# Patient Record
Sex: FEMALE | Race: WHITE | Employment: FULL TIME | URBAN - METROPOLITAN AREA
[De-identification: names, ages, dates, MRNs, and addresses within clinical notes are randomized per-mention and may not be internally consistent; named-entity substitution may affect disease eponyms.]

---

## 2017-02-27 ENCOUNTER — OFFICE VISIT (OUTPATIENT)
Dept: FAMILY MEDICINE | Facility: CLINIC | Age: 32
End: 2017-02-27
Payer: COMMERCIAL

## 2017-02-27 VITALS
HEART RATE: 84 BPM | DIASTOLIC BLOOD PRESSURE: 70 MMHG | WEIGHT: 178 LBS | HEIGHT: 70 IN | BODY MASS INDEX: 25.48 KG/M2 | TEMPERATURE: 97.7 F | RESPIRATION RATE: 20 BRPM | SYSTOLIC BLOOD PRESSURE: 116 MMHG

## 2017-02-27 DIAGNOSIS — Z30.430 ENCOUNTER FOR IUD INSERTION: ICD-10-CM

## 2017-02-27 DIAGNOSIS — Z00.00 ROUTINE GENERAL MEDICAL EXAMINATION AT A HEALTH CARE FACILITY: Primary | ICD-10-CM

## 2017-02-27 DIAGNOSIS — B36.0 TINEA VERSICOLOR: ICD-10-CM

## 2017-02-27 LAB
ANION GAP SERPL CALCULATED.3IONS-SCNC: 7 MMOL/L (ref 3–14)
BETA HCG QUAL IFA URINE: NEGATIVE
BUN SERPL-MCNC: 9 MG/DL (ref 7–30)
CALCIUM SERPL-MCNC: 8.9 MG/DL (ref 8.5–10.1)
CHLORIDE SERPL-SCNC: 112 MMOL/L (ref 94–109)
CHOLEST SERPL-MCNC: 153 MG/DL
CO2 SERPL-SCNC: 23 MMOL/L (ref 20–32)
CREAT SERPL-MCNC: 0.85 MG/DL (ref 0.52–1.04)
GFR SERPL CREATININE-BSD FRML MDRD: 77 ML/MIN/1.7M2
GLUCOSE SERPL-MCNC: 88 MG/DL (ref 70–99)
HDLC SERPL-MCNC: 39 MG/DL
LDLC SERPL CALC-MCNC: 86 MG/DL
NONHDLC SERPL-MCNC: 114 MG/DL
POTASSIUM SERPL-SCNC: 4.2 MMOL/L (ref 3.4–5.3)
SODIUM SERPL-SCNC: 142 MMOL/L (ref 133–144)
TRIGL SERPL-MCNC: 140 MG/DL

## 2017-02-27 PROCEDURE — 80048 BASIC METABOLIC PNL TOTAL CA: CPT | Performed by: FAMILY MEDICINE

## 2017-02-27 PROCEDURE — 87624 HPV HI-RISK TYP POOLED RSLT: CPT | Performed by: FAMILY MEDICINE

## 2017-02-27 PROCEDURE — G0145 SCR C/V CYTO,THINLAYER,RESCR: HCPCS | Performed by: FAMILY MEDICINE

## 2017-02-27 PROCEDURE — 58300 INSERT INTRAUTERINE DEVICE: CPT | Performed by: FAMILY MEDICINE

## 2017-02-27 PROCEDURE — 84703 CHORIONIC GONADOTROPIN ASSAY: CPT | Performed by: FAMILY MEDICINE

## 2017-02-27 PROCEDURE — 80061 LIPID PANEL: CPT | Performed by: FAMILY MEDICINE

## 2017-02-27 PROCEDURE — 99395 PREV VISIT EST AGE 18-39: CPT | Mod: 25 | Performed by: FAMILY MEDICINE

## 2017-02-27 PROCEDURE — 36415 COLL VENOUS BLD VENIPUNCTURE: CPT | Performed by: FAMILY MEDICINE

## 2017-02-27 RX ORDER — KETOCONAZOLE 20 MG/G
CREAM TOPICAL 2 TIMES DAILY
Qty: 60 G | Refills: 1 | Status: SHIPPED | OUTPATIENT
Start: 2017-02-27 | End: 2018-01-09

## 2017-02-27 NOTE — PROGRESS NOTES
SUBJECTIVE:     CC: Diana Roblero is an 31 year old woman who presents for preventive health visit.     Healthy Habits:    Do you get at least three servings of calcium containing foods daily (dairy, green leafy vegetables, etc.)? no, taking calcium and/or vitamin D supplement: yes - vit d    Amount of exercise or daily activities, outside of work: 1 day(s) per week    Problems taking medications regularly No    Medication side effects: No    Have you had an eye exam in the past two years? yes    Do you see a dentist twice per year? yes    Do you have sleep apnea, excessive snoring or daytime drowsiness?yes with monthly menstrual cycle      Wondering about getting IUD with exam today.  Does have period associated migraines.  LMP 2/21/17.  Notes periods have been heavier and crampier recently.  Bleeding is heavy in early period, essentially done now.  Has been using condoms.  Last intercourse 2/19/17.    Today's PHQ-2 Score:   PHQ-2 ( 1999 [employer]) 2/27/2017 10/23/2015   Q1: Little interest or pleasure in doing things 0 1   Q2: Feeling down, depressed or hopeless 0 1   PHQ-2 Score 0 2       Abuse: Current or Past(Physical, Sexual or Emotional)- No  Do you feel safe in your environment - Yes    Social History   Substance Use Topics     Smoking status: Current Every Day Smoker     Packs/day: 1.00     Types: Cigarettes     Smokeless tobacco: Never Used     Alcohol use No     The patient does not drink >3 drinks per day nor >7 drinks per week.    No results for input(s): CHOL, HDL, LDL, TRIG, CHOLHDLRATIO, NHDL in the last 75246 hours.    Reviewed orders with patient.  Reviewed health maintenance and updated orders accordingly - Yes    Mammo Decision Support:  Mammogram not appropriate for this patient based on age.    Pertinent mammograms are reviewed under the imaging tab.  History of abnormal Pap smear: NO - age 30- 65 PAP every 3 years recommended  All Histories reviewed and updated in Epic.      ROS:  C:  NEGATIVE for fever, chills, change in weight  I: NEGATIVE for worrisome rashes, moles or lesions  E: NEGATIVE for vision changes or irritation  ENT: NEGATIVE for ear, mouth and throat problems  R: NEGATIVE for significant cough or SOB  B: NEGATIVE for masses, tenderness or discharge  CV: NEGATIVE for chest pain, palpitations or peripheral edema  GI: NEGATIVE for nausea, abdominal pain, heartburn, or change in bowel habits  : NEGATIVE for unusual urinary or vaginal symptoms. No vaginal bleeding.  M: NEGATIVE for significant arthralgias or myalgia  N: NEGATIVE for weakness, dizziness or paresthesias  P: NEGATIVE for changes in mood or affect     Problem list, Medication list, Allergies, and Medical/Social/Surgical histories reviewed in [facility] and updated as appropriate.  OBJECTIVE:     There were no vitals taken for this visit.  EXAM:  GENERAL: healthy, alert and no distress  EYES: Eyes grossly normal to inspection, PERRL and conjunctivae and sclerae normal  HENT: ear canals and TM's normal, nose and mouth without ulcers or lesions  NECK: no adenopathy, no asymmetry, masses, or scars and thyroid normal to palpation  RESP: lungs clear to auscultation - no rales, rhonchi or wheezes  CV: regular rate and rhythm, normal S1 S2, no S3 or S4, no murmur, click or rub, no peripheral edema and peripheral pulses strong  ABDOMEN: soft, nontender, no hepatosplenomegaly, no masses and bowel sounds normal  MS: no gross musculoskeletal defects noted, no edema  SKIN: no suspicious lesions or rashes  NEURO: Normal strength and tone, mentation intact and speech normal  PSYCH: mentation appears normal, affect normal/bright    ASSESSMENT/PLAN:     1. Routine general medical examination at a health care facility    - LIPID REFLEX TO DIRECT LDL PANEL  - Pap imaged thin layer screen with HPV - recommended age 30 - 65  - Basic metabolic panel    2. Encounter for IUD insertion  Upt-neg   Sterile spec   Betadaine prep    Tenaculum placed  "  Sounded to 7.5 cm   MIrena inserted   String clipped   Observed for 30 min    - Beta HCG qual IFA urine  - levonorgestrel (MIRENA) 20 MCG/24HR IUD; 1 each (20 mcg) by Intrauterine route once for 1 dose  Dispense: 1 each; Refill: 0  - INSERTION INTRAUTERINE DEVICE    3. Tinea versicolor    - ketoconazole (NIZORAL) 2 % cream; Apply topically 2 times daily  Dispense: 60 g; Refill: 1    COUNSELING:   Reviewed preventive health counseling, as reflected in patient instructions       Regular exercise       Vision screening         reports that she has been smoking Cigarettes.  She has been smoking about 1.00 pack per day. She has never used smokeless tobacco.  Tobacco Cessation Action Plan: Information offered: Patient not interested at this time  Estimated body mass index is 26.91 kg/(m^2) as calculated from the following:    Height as of 7/19/16: 5' 9\" (1.753 m).    Weight as of 11/1/16: 182 lb 3.2 oz (82.6 kg).       Counseling Resources:  ATP IV Guidelines  Pooled Cohorts Equation Calculator  Breast Cancer Risk Calculator  FRAX Risk Assessment  ICSI Preventive Guidelines  Dietary Guidelines for Americans, 2010  [employer]'s MyPlate  ASA Prophylaxis  Lung CA Screening    Jignesh Fine MD  [facility]  "

## 2017-02-27 NOTE — MR AVS SNAPSHOT
After Visit Summary   2/27/2017    Diana Roblero    MRN: [id number]           Patient Information     Date Of Birth          1985        Visit Information        Provider Department      2/27/2017 8:00 AM Jignesh Fine MD [facility]        Today's Diagnoses     Routine general medical examination at a health care facility    -  1    Encounter for IUD insertion        Tinea versicolor          Care Instructions      Preventive Health Recommendations  Female Ages 26 - 39  Yearly exam:   See your health care provider every year in order to    Review health changes.     Discuss preventive care.      Review your medicines if you your doctor has prescribed any.    Until age 30: Get a Pap test every three years (more often if you have had an abnormal result).    After age 30: Talk to your doctor about whether you should have a Pap test every 3 years or have a Pap test with HPV screening every 5 years.   You do not need a Pap test if your uterus was removed (hysterectomy) and you have not had cancer.  You should be tested each year for STDs (sexually transmitted diseases), if you're at risk.   Talk to your provider about how often to have your cholesterol checked.  If you are at risk for diabetes, you should have a diabetes test (fasting glucose).  Shots: Get a flu shot each year. Get a tetanus shot every 10 years.   Nutrition:     Eat at least 5 servings of fruits and vegetables each day.    Eat whole-grain bread, whole-wheat pasta and brown rice instead of white grains and rice.    Talk to your provider about Calcium and Vitamin D.     Lifestyle    Exercise at least 150 minutes a week (30 minutes a day, 5 days of the week). This will help you control your weight and prevent disease.    Limit alcohol to one drink per day.    No smoking.     Wear sunscreen to prevent skin cancer.    See your dentist every six months for an exam and cleaning.          Follow-ups after  "your visit        Follow-up notes from your care team     Return in about 1 year (around 2/27/2018).      Who to contact     If you have questions or need follow up information about today's clinic visit or your schedule please contact [facility] directly at [phone].  Normal or non-critical lab and imaging results will be communicated to you by MyChart, letter or phone within 4 business days after the clinic has received the results. If you do not hear from us within 7 days, please contact the clinic through PassH[employer]art or phone. If you have a critical or abnormal lab result, we will notify you by phone as soon as possible.  Submit refill requests through [employer] or call your pharmacy and they will forward the refill request to us. Please allow 3 business days for your refill to be completed.          Additional Information About Your Visit        MyChart Information     [employer] gives you secure access to your electronic health record. If you see a primary care provider, you can also send messages to your care team and make appointments. If you have questions, please call your primary care clinic.  If you do not have a primary care provider, please call [phone] and they will assist you.        Care EveryWhere ID     This is your Care EveryWhere ID. This could be used by other organizations to access your [place] medical records  [id number]        Your Vitals Were     Pulse Temperature Respirations Height Last Period BMI (Body Mass Index)    84 97.7  F (36.5  C) (Oral) 20 5' 9.75\" (1.772 m) 02/21/2017 (Exact Date) 25.72 kg/m2       Blood Pressure from Last 3 Encounters:   02/27/17 116/70   11/01/16 102/64   07/19/16 124/72    Weight from Last 3 Encounters:   02/27/17 178 lb (80.7 kg)   11/01/16 182 lb 3.2 oz (82.6 kg)   07/19/16 182 lb (82.6 kg)              We Performed the Following     Basic metabolic panel     Beta HCG qual IFA urine     INSERTION INTRAUTERINE DEVICE     LIPID REFLEX TO " DIRECT LDL PANEL     Pap imaged thin layer screen with HPV - recommended age 30 - 65          Today's Medication Changes          These changes are accurate as of: 2/27/17  8:59 AM.  If you have any questions, ask your nurse or doctor.               Start taking these medicines.        Dose/Directions    ketoconazole 2 % cream   Commonly known as:  NIZORAL   Used for:  Tinea versicolor   Started by:  Jignesh Fine MD        Apply topically 2 times daily   Quantity:  60 g   Refills:  1       levonorgestrel 20 MCG/24HR IUD   Commonly known as:  MIRENA   Used for:  Encounter for IUD insertion   Started by:  Jignesh Fine MD        Dose:  1 each   1 each (20 mcg) by Intrauterine route once for 1 dose   Quantity:  1 each   Refills:  0            Where to get your medicines      These medications were sent to [facility] [address] [facility] [address]  [address] [facility] [address]     Phone:  [phone]     ketoconazole 2 % cream         Some of these will need a paper prescription and others can be bought over the counter.  Ask your nurse if you have questions.     You don't need a prescription for these medications     levonorgestrel 20 MCG/24HR IUD                Primary Care Provider Office Phone # Fax #    Jignesh Fine -[phone]       [facility] [address]  [facility] [address]        Thank you!     Thank you for choosing [facility]  for your care. Our goal is always to provide you with excellent care. Hearing back from our patients is one way we can continue to improve our services. Please take a few minutes to complete the written survey that you may receive in the mail after your visit with us. Thank you!             Your Updated Medication List - Protect others around you: Learn how to safely use, store and throw away your medicines at www.disposemymeds.org.          This  list is accurate as of: 2/27/17  8:59 AM.  Always use your most recent med list.                   Brand Name Dispense Instructions for use    busPIRone 10 MG tablet    BUSPAR    540 tablet    Take 2 tablets (20 mg) by mouth 3 times daily       escitalopram 20 MG tablet    LEXAPRO    90 tablet    Take 1 tablet (20 mg) by mouth daily       ketoconazole 2 % cream    NIZORAL    60 g    Apply topically 2 times daily       levonorgestrel 20 MCG/24HR IUD    MIRENA    1 each    1 each (20 mcg) by Intrauterine route once for 1 dose       SUMAtriptan 100 MG tablet    IMITREX    30 tablet    Take 1 tablet (100 mg) by mouth at onset of headache for migraine (May repeat in 2 hours if needed.  Do not exceed 2 tabs in 24 hours.)       topiramate 100 MG tablet    TOPAMAX    90 tablet    Take 1 tablet (100 mg) by mouth daily       VITAMIN D-3 PO      Take 2,000 Units by mouth

## 2017-02-27 NOTE — NURSING NOTE
"Chief Complaint   Patient presents with     Physical     with pap     Blood Draw     patient IS fasting       Initial /70 (BP Location: Right arm, Patient Position: Chair, Cuff Size: Adult Regular)  Pulse 84  Temp 97.7  F (36.5  C) (Oral)  Resp 20  Ht 5' 9.75\" (1.772 m)  Wt 178 lb (80.7 kg)  LMP 02/21/2017 (Exact Date)  BMI 25.72 kg/m2 Estimated body mass index is 25.72 kg/(m^2) as calculated from the following:    Height as of this encounter: 5' 9.75\" (1.772 m).    Weight as of this encounter: 178 lb (80.7 kg).  Medication Reconciliation: complete   Jo Merrill MA      "

## 2017-03-01 LAB
COPATH REPORT: NORMAL
PAP: NORMAL

## 2017-03-02 LAB
FINAL DIAGNOSIS: NORMAL
HPV HR 12 DNA CVX QL NAA+PROBE: NEGATIVE
HPV16 DNA SPEC QL NAA+PROBE: NEGATIVE
HPV18 DNA SPEC QL NAA+PROBE: NEGATIVE
SPECIMEN DESCRIPTION: NORMAL

## 2017-03-08 ENCOUNTER — OFFICE VISIT (OUTPATIENT)
Dept: FAMILY MEDICINE | Facility: CLINIC | Age: 32
End: 2017-03-08
Payer: COMMERCIAL

## 2017-03-08 VITALS
SYSTOLIC BLOOD PRESSURE: 114 MMHG | DIASTOLIC BLOOD PRESSURE: 78 MMHG | HEART RATE: 80 BPM | WEIGHT: 179.3 LBS | BODY MASS INDEX: 25.67 KG/M2 | TEMPERATURE: 98 F | HEIGHT: 70 IN

## 2017-03-08 DIAGNOSIS — R30.0 DYSURIA: ICD-10-CM

## 2017-03-08 DIAGNOSIS — N76.0 BACTERIAL VAGINOSIS: Primary | ICD-10-CM

## 2017-03-08 DIAGNOSIS — R10.9 FLANK PAIN: ICD-10-CM

## 2017-03-08 DIAGNOSIS — B96.89 BACTERIAL VAGINOSIS: Primary | ICD-10-CM

## 2017-03-08 DIAGNOSIS — R10.2 PELVIC PAIN IN FEMALE: ICD-10-CM

## 2017-03-08 LAB
ALBUMIN UR-MCNC: NEGATIVE MG/DL
APPEARANCE UR: CLEAR
BACTERIA #/AREA URNS HPF: ABNORMAL /HPF
BILIRUB UR QL STRIP: NEGATIVE
COLOR UR AUTO: ABNORMAL
GLUCOSE UR STRIP-MCNC: NEGATIVE MG/DL
HGB UR QL STRIP: ABNORMAL
KETONES UR STRIP-MCNC: ABNORMAL MG/DL
LEUKOCYTE ESTERASE UR QL STRIP: NEGATIVE
MICRO REPORT STATUS: ABNORMAL
NITRATE UR QL: NEGATIVE
NON-SQ EPI CELLS #/AREA URNS LPF: ABNORMAL /LPF
PH UR STRIP: 6.5 PH (ref 5–7)
RBC #/AREA URNS AUTO: ABNORMAL /HPF (ref 0–2)
SP GR UR STRIP: 1.02 (ref 1–1.03)
SPECIMEN SOURCE: ABNORMAL
URN SPEC COLLECT METH UR: ABNORMAL
UROBILINOGEN UR STRIP-ACNC: 0.2 EU/DL (ref 0.2–1)
WBC #/AREA URNS AUTO: ABNORMAL /HPF (ref 0–2)
WET PREP SPEC: ABNORMAL

## 2017-03-08 PROCEDURE — 81001 URINALYSIS AUTO W/SCOPE: CPT | Performed by: NURSE PRACTITIONER

## 2017-03-08 PROCEDURE — 87210 SMEAR WET MOUNT SALINE/INK: CPT | Performed by: NURSE PRACTITIONER

## 2017-03-08 PROCEDURE — 99213 OFFICE O/P EST LOW 20 MIN: CPT | Performed by: NURSE PRACTITIONER

## 2017-03-08 PROCEDURE — 87086 URINE CULTURE/COLONY COUNT: CPT | Performed by: NURSE PRACTITIONER

## 2017-03-08 RX ORDER — CLINDAMYCIN HCL 300 MG
300 CAPSULE ORAL 2 TIMES DAILY
Qty: 14 CAPSULE | Refills: 0 | Status: SHIPPED | OUTPATIENT
Start: 2017-03-08 | End: 2017-03-15

## 2017-03-08 NOTE — PROGRESS NOTES
"  SUBJECTIVE:                                                    Diana Roblero is a 31 year old female who presents to clinic today for the following health issues:      URINARY TRACT SYMPTOMS      Duration: Last Tuesday    Description  Pressure in bladder, and lower pain pain, dark urine    Intensity:  moderate    Accompanying signs and symptoms:  Fever/chills: no   Flank pain no   Nausea and vomiting: no   Vaginal symptoms: none  Abdominal/Pelvic Pain: YES    History  History of frequent UTI's: no   History of kidney stones: YES  Sexually Active: YES  Possibility of pregnancy:     Precipitating or alleviating factors: IUD placed last week    Therapies tried and outcome: none            Reviewed and updated as needed this visit by clinical staff  Tobacco  Allergies  Meds  Med Hx  Surg Hx  Fam Hx  Soc Hx      Reviewed and updated as needed this visit by Provider         ROS:  Constitutional, HEENT, cardiovascular, pulmonary, gi and gu systems are negative, except as otherwise noted.    OBJECTIVE:                                                    /78  Pulse 80  Temp 98  F (36.7  C) (Oral)  Ht 5' 9.75\" (1.772 m)  Wt 179 lb 4.8 oz (81.3 kg)  LMP 02/21/2017 (Exact Date)  BMI 25.91 kg/m2  Body mass index is 25.91 kg/(m^2).   young female in no acute distress. Nontoxic looking. Exam was performed under chaperone. Soft abdomen that was nontender with bowel sounds active throughout. Some pelvic discomfort. No flank tenderness. Pelvic exam revealing a well rugated vaginal introitus. Cervix was closed with IUD strings being noted. Mucus is white odorous discharge.    Results for orders placed or performed in visit on 03/08/17   *UA reflex to Microscopic and Culture ([facility] (except [place] and [place])   Result Value Ref Range    Color Urine Orange     Appearance Urine Clear     Glucose Urine Negative NEG mg/dL    Bilirubin Urine Negative NEG    Ketones Urine " Trace (A) NEG mg/dL    Specific Gravity Urine 1.025 1.003 - 1.035    Blood Urine Trace (A) NEG    pH Urine 6.5 5.0 - 7.0 pH    Protein Albumin Urine Negative NEG mg/dL    Urobilinogen Urine 0.2 0.2 - 1.0 EU/dL    Nitrite Urine Negative NEG    Leukocyte Esterase Urine Negative NEG    Source Midstream Urine    Urine Microscopic   Result Value Ref Range    WBC Urine O - 2 0 - 2 /HPF    RBC Urine O - 2 0 - 2 /HPF    Squamous Epithelial /LPF Urine Few FEW /LPF    Bacteria Urine Few (A) NEG /HPF   Wet prep   Result Value Ref Range    Specimen Description Vagina     Wet Prep (A)      Clue cells seen  No Trichomonas seen  No yeast seen      Micro Report Status FINAL 03/08/2017             ASSESSMENT/PLAN:                                                      Symptoms probably from bacterial vaginitis. Other differentials discussed. Treated as below. Follow-up with any ongoing concerns.        ICD-10-CM    1. Dysuria R30.0 *UA reflex to Microscopic and Culture ([facility] (except [place] and [place])     Urine Microscopic     Urine Culture Aerobic Bacterial   2. Pelvic pain in female R10.2 Wet prep   3. Flank pain R10.9    4. Bacterial vaginosis N76.0 clindamycin (CLEOCIN) 300 MG capsule    B96.89          ELISA Weston [facility]

## 2017-03-08 NOTE — NURSING NOTE
"Chief Complaint   Patient presents with     UTI       Initial /78  Pulse 80  Temp 98  F (36.7  C) (Oral)  Ht 5' 9.75\" (1.772 m)  Wt 179 lb 4.8 oz (81.3 kg)  LMP 02/21/2017 (Exact Date)  BMI 25.91 kg/m2 Estimated body mass index is 25.91 kg/(m^2) as calculated from the following:    Height as of this encounter: 5' 9.75\" (1.772 m).    Weight as of this encounter: 179 lb 4.8 oz (81.3 kg).  Medication Reconciliation: complete     Carine Mata CMA      "

## 2017-03-08 NOTE — MR AVS SNAPSHOT
After Visit Summary   3/8/2017    Diana Roblero    MRN: [id number]           Patient Information     Date Of Birth          1985        Visit Information        Provider Department      3/8/2017 4:00 PM Mykel Hoang APRN [facility]        Today's Diagnoses     Dysuria    -  1    Pelvic pain in female        Flank pain        Bacterial vaginosis          Care Instructions      Bacterial Vaginosis    You have a vaginal infection called bacterial vaginosis (BV). Both good and bad bacteria are present in a healthy vagina. BV occurs when these bacteria get out of balance. The number of bad bacteria increase. And the number of good bacteria decrease.  BV may or may not cause symptoms. If symptoms do occur, they can include:    Thin, gray, milky-white, or sometimes green discharge    Unpleasant odor or  fishy  smell    Itching, burning, or pain in or around the vagina  It is not known what causes BV, but certain factors can make the problem more likely. This can include:    Douching    Having sex with a new partner    Having sex with more than one partner  BV will sometimes go away on its own. But treatment is usually recommended. This is because untreated BV can increase the risk of more serious health problems such as:    Pelvic inflammatory disease (PID)     delivery (giving birth to a baby early if you re pregnant)    HIV and certain other sexually transmitted diseases (STDs)    Infection after surgery on the reproductive organs  Home care  General care    BV is most often treated with medicines called antibiotics. These may be given as pills or as a vaginal cream. If antibiotics are prescribed, be sure to use them exactly as directed. Also, be sure to complete all of the medicine, even if your symptoms go away.    Avoid douching or having sex during treatment.    If you have sex with a female partner, ask your healthcare provider if she should also be  treated.  Prevention    Limit or avoid douching.    Avoid having sex. If you do have sex, then take steps to lower your risk:    Use condoms when having sex.    Limit the number of partners you have sex with.  Follow-up care  Follow up with your healthcare provider, or as advised.  When to seek medical advice  Call your healthcare provider right away if:    You have a fever of 100.4 F (38 C) or higher, or as directed by your provider.    Your symptoms worsen, or they don t go away within a few days of starting treatment.    You have new pain in the lower belly or pelvic region.    You have side effects that bother you or a reaction to the pills or cream you re prescribed.    You or any partners you have sex with have new symptoms, such as a rash, joint pain, or sores.    3005-0232 [employer]. [address] [place] [address]. All rights reserved. This information is not intended as a substitute for professional medical care. Always follow your healthcare professional's instructions.              Follow-ups after your visit        Who to contact     If you have questions or need follow up information about today's clinic visit or your schedule please contact [facility] directly at [phone].  Normal or non-critical lab and imaging results will be communicated to you by MyChart, letter or phone within 4 business days after the clinic has received the results. If you do not hear from us within 7 days, please contact the clinic through [employer] or phone. If you have a critical or abnormal lab result, we will notify you by phone as soon as possible.  Submit refill requests through [employer] or call your pharmacy and they will forward the refill request to us. Please allow 3 business days for your refill to be completed.          Additional Information About Your Visit        [employer] Information     [employer] gives you secure access to your electronic health record. If you see a  "primary care provider, you can also send messages to your care team and make appointments. If you have questions, please call your primary care clinic.  If you do not have a primary care provider, please call [phone] and they will assist you.        Care EveryWhere ID     This is your Care EveryWhere ID. This could be used by other organizations to access your [place] medical records  [id number]        Your Vitals Were     Pulse Temperature Height Last Period BMI (Body Mass Index)       80 98  F (36.7  C) (Oral) 5' 9.75\" (1.772 m) 02/21/2017 (Exact Date) 25.91 kg/m2        Blood Pressure from Last 3 Encounters:   03/08/17 114/78   02/27/17 116/70   11/01/16 102/64    Weight from Last 3 Encounters:   03/08/17 179 lb 4.8 oz (81.3 kg)   02/27/17 178 lb (80.7 kg)   11/01/16 182 lb 3.2 oz (82.6 kg)              We Performed the Following     *UA reflex to Microscopic and Culture ([facility] (except [place] and [place])     Urine Culture Aerobic Bacterial     Urine Microscopic     Wet prep          Today's Medication Changes          These changes are accurate as of: 3/8/17  5:19 PM.  If you have any questions, ask your nurse or doctor.               Start taking these medicines.        Dose/Directions    clindamycin 300 MG capsule   Commonly known as:  CLEOCIN   Used for:  Bacterial vaginosis   Started by:  Mykel Hoang APRN CNP        Dose:  300 mg   Take 1 capsule (300 mg) by mouth 2 times daily for 7 days   Quantity:  14 capsule   Refills:  0            Where to get your medicines      These medications were sent to [facility] [address] [facility] [address] [address] [facility], [facility] [address]     Phone:  [phone]     clindamycin 300 MG capsule                Primary Care Provider Office Phone # Fax #    Jignesh Fine [phone]       [facility] [address]  [address] [facility] " [place] [address]        Thank you!     Thank you for choosing [facility]  for your care. Our goal is always to provide you with excellent care. Hearing back from our patients is one way we can continue to improve our services. Please take a few minutes to complete the written survey that you may receive in the mail after your visit with us. Thank you!             Your Updated Medication List - Protect others around you: Learn how to safely use, store and throw away your medicines at www.disposemymeds.org.          This list is accurate as of: 3/8/17  5:19 PM.  Always use your most recent med list.                   Brand Name Dispense Instructions for use    busPIRone 10 MG tablet    BUSPAR    540 tablet    Take 2 tablets (20 mg) by mouth 3 times daily       clindamycin 300 MG capsule    CLEOCIN    14 capsule    Take 1 capsule (300 mg) by mouth 2 times daily for 7 days       escitalopram 20 MG tablet    LEXAPRO    90 tablet    Take 1 tablet (20 mg) by mouth daily       ketoconazole 2 % cream    NIZORAL    60 g    Apply topically 2 times daily       levonorgestrel 20 MCG/24HR IUD    MIRENA    1 each    1 each (20 mcg) by Intrauterine route once for 1 dose       SUMAtriptan 100 MG tablet    IMITREX    30 tablet    Take 1 tablet (100 mg) by mouth at onset of headache for migraine (May repeat in 2 hours if needed.  Do not exceed 2 tabs in 24 hours.)       topiramate 100 MG tablet    TOPAMAX    90 tablet    Take 1 tablet (100 mg) by mouth daily       VITAMIN D-3 PO      Take 2,000 Units by mouth

## 2017-03-08 NOTE — PATIENT INSTRUCTIONS
Bacterial Vaginosis    You have a vaginal infection called bacterial vaginosis (BV). Both good and bad bacteria are present in a healthy vagina. BV occurs when these bacteria get out of balance. The number of bad bacteria increase. And the number of good bacteria decrease.  BV may or may not cause symptoms. If symptoms do occur, they can include:    Thin, gray, milky-white, or sometimes green discharge    Unpleasant odor or  fishy  smell    Itching, burning, or pain in or around the vagina  It is not known what causes BV, but certain factors can make the problem more likely. This can include:    Douching    Having sex with a new partner    Having sex with more than one partner  BV will sometimes go away on its own. But treatment is usually recommended. This is because untreated BV can increase the risk of more serious health problems such as:    Pelvic inflammatory disease (PID)     delivery (giving birth to a baby early if you re pregnant)    HIV and certain other sexually transmitted diseases (STDs)    Infection after surgery on the reproductive organs  Home care  General care    BV is most often treated with medicines called antibiotics. These may be given as pills or as a vaginal cream. If antibiotics are prescribed, be sure to use them exactly as directed. Also, be sure to complete all of the medicine, even if your symptoms go away.    Avoid douching or having sex during treatment.    If you have sex with a female partner, ask your healthcare provider if she should also be treated.  Prevention    Limit or avoid douching.    Avoid having sex. If you do have sex, then take steps to lower your risk:    Use condoms when having sex.    Limit the number of partners you have sex with.  Follow-up care  Follow up with your healthcare provider, or as advised.  When to seek medical advice  Call your healthcare provider right away if:    You have a fever of 100.4 F (38 C) or higher, or as directed by your  provider.    Your symptoms worsen, or they don t go away within a few days of starting treatment.    You have new pain in the lower belly or pelvic region.    You have side effects that bother you or a reaction to the pills or cream you re prescribed.    You or any partners you have sex with have new symptoms, such as a rash, joint pain, or sores.    6368-2535 [employer]. [address] [place] [address], [place] [address]. All rights reserved. This information is not intended as a substitute for professional medical care. Always follow your healthcare professional's instructions.

## 2017-03-09 LAB
BACTERIA SPEC CULT: NO GROWTH
MICRO REPORT STATUS: NORMAL
SPECIMEN SOURCE: NORMAL

## 2017-05-25 DIAGNOSIS — G43.109 MIGRAINE WITH AURA AND WITHOUT STATUS MIGRAINOSUS, NOT INTRACTABLE: ICD-10-CM

## 2017-05-25 RX ORDER — TOPIRAMATE 100 MG/1
TABLET, FILM COATED ORAL
Qty: 90 TABLET | Refills: 1 | Status: SHIPPED | OUTPATIENT
Start: 2017-05-25 | End: 2017-08-15

## 2017-05-25 NOTE — TELEPHONE ENCOUNTER
Prescription approved per [facility] [place].  Jeri Wheatley RN

## 2017-05-25 NOTE — TELEPHONE ENCOUNTER
topiramate (TOPAMAX) 100 MG tablet       Last Written Prescription Date: 11/1/16  Last Fill Quantity: 90, # refills: 1    Last Office Visit with G, [facility] or [facility] prescribing provider:  3/8/2017     Future Office Visit:      Creatinine   Date Value Ref Range Status   02/27/2017 0.85 0.52 - 1.04 mg/dL Final     ZEINA Blake  May 25, 2017  8:37 AM

## 2017-07-17 DIAGNOSIS — F33.1 MAJOR DEPRESSIVE DISORDER, RECURRENT EPISODE, MODERATE (H): ICD-10-CM

## 2017-07-17 NOTE — LETTER
[facility]  July 18, 2017       [place] [address]           Phone :  [phone]          Fax:  [phone]  Diana Roblero  [address] [facility] [address]      Dear Diana,    We recently received a call from your pharmacy requesting a refill of your medication - LEXAPRO.    A review of your chart indicates that an appointment is required with your provider for medication check. Please call the clinic at [phone] to schedule your appointment.    We have authorized one refill of your medication to allow time for you to schedule your appointment.    Taking care of your health is important to us, and ongoing visits with your provider are vital to your care.  We look forward to seeing you in the near future.        Sincerely,        Jignesh Fine MD / Jeri Wheatley RN

## 2017-07-17 NOTE — TELEPHONE ENCOUNTER
escitalopram (LEXAPRO) 20 MG tablet     Last Written Prescription Date: 11/1/16  Last Fill Quantity: 90, # refills: 1  Last Office Visit with G primary care provider: 3/8/2017       Last PHQ-9 score on record=   PHQ-9 SCORE 11/1/2016   Total Score 3         ZEINA Blake  July 17, 2017  9:03 AM

## 2017-07-18 RX ORDER — ESCITALOPRAM OXALATE 20 MG/1
TABLET ORAL
Qty: 90 TABLET | Refills: 0 | Status: SHIPPED | OUTPATIENT
Start: 2017-07-18 | End: 2017-08-15

## 2017-07-18 NOTE — TELEPHONE ENCOUNTER
Medication is being filled for 1 time refill only due to:  Patient needs to be seen because needs 6 month medication check and update PHQ-9.  Letter sent to patient.  Jeri Wheatley RN

## 2017-08-01 ENCOUNTER — TRANSFERRED RECORDS (OUTPATIENT)
Dept: HEALTH INFORMATION MANAGEMENT | Facility: CLINIC | Age: 32
End: 2017-08-01

## 2017-08-15 ENCOUNTER — OFFICE VISIT (OUTPATIENT)
Dept: FAMILY MEDICINE | Facility: CLINIC | Age: 32
End: 2017-08-15
Payer: COMMERCIAL

## 2017-08-15 VITALS
BODY MASS INDEX: 26.07 KG/M2 | RESPIRATION RATE: 20 BRPM | HEART RATE: 80 BPM | SYSTOLIC BLOOD PRESSURE: 106 MMHG | TEMPERATURE: 98.1 F | WEIGHT: 180.4 LBS | DIASTOLIC BLOOD PRESSURE: 72 MMHG

## 2017-08-15 DIAGNOSIS — F33.1 MAJOR DEPRESSIVE DISORDER, RECURRENT EPISODE, MODERATE (H): Primary | ICD-10-CM

## 2017-08-15 DIAGNOSIS — G43.109 MIGRAINE WITH AURA AND WITHOUT STATUS MIGRAINOSUS, NOT INTRACTABLE: ICD-10-CM

## 2017-08-15 DIAGNOSIS — Z23 ENCOUNTER FOR IMMUNIZATION: ICD-10-CM

## 2017-08-15 DIAGNOSIS — E73.9 LACTOSE INTOLERANCE: ICD-10-CM

## 2017-08-15 PROCEDURE — 90715 TDAP VACCINE 7 YRS/> IM: CPT | Performed by: FAMILY MEDICINE

## 2017-08-15 PROCEDURE — 90471 IMMUNIZATION ADMIN: CPT | Performed by: FAMILY MEDICINE

## 2017-08-15 PROCEDURE — 99214 OFFICE O/P EST MOD 30 MIN: CPT | Mod: 25 | Performed by: FAMILY MEDICINE

## 2017-08-15 RX ORDER — TOPIRAMATE 100 MG/1
100 TABLET, FILM COATED ORAL DAILY
Qty: 90 TABLET | Refills: 1 | Status: SHIPPED | OUTPATIENT
Start: 2017-08-15 | End: 2018-02-13

## 2017-08-15 RX ORDER — BUSPIRONE HYDROCHLORIDE 10 MG/1
20 TABLET ORAL 3 TIMES DAILY
Qty: 540 TABLET | Refills: 1 | Status: SHIPPED | OUTPATIENT
Start: 2017-08-15 | End: 2018-02-13

## 2017-08-15 RX ORDER — ESCITALOPRAM OXALATE 20 MG/1
20 TABLET ORAL DAILY
Qty: 90 TABLET | Refills: 0 | Status: SHIPPED | OUTPATIENT
Start: 2017-08-15 | End: 2018-01-17

## 2017-08-15 ASSESSMENT — ANXIETY QUESTIONNAIRES
1. FEELING NERVOUS, ANXIOUS, OR ON EDGE: MORE THAN HALF THE DAYS
3. WORRYING TOO MUCH ABOUT DIFFERENT THINGS: MORE THAN HALF THE DAYS
GAD7 TOTAL SCORE: 14
4. TROUBLE RELAXING: NEARLY EVERY DAY
5. BEING SO RESTLESS THAT IT IS HARD TO SIT STILL: SEVERAL DAYS
6. BECOMING EASILY ANNOYED OR IRRITABLE: NEARLY EVERY DAY
2. NOT BEING ABLE TO STOP OR CONTROL WORRYING: MORE THAN HALF THE DAYS
7. FEELING AFRAID AS IF SOMETHING AWFUL MIGHT HAPPEN: SEVERAL DAYS
7. FEELING AFRAID AS IF SOMETHING AWFUL MIGHT HAPPEN: SEVERAL DAYS

## 2017-08-15 ASSESSMENT — ENCOUNTER SYMPTOMS
INSOMNIA: 0
CARDIOVASCULAR NEGATIVE: 1
CONSTITUTIONAL NEGATIVE: 1
NERVOUS/ANXIOUS: 1
DEPRESSION: 0
RESPIRATORY NEGATIVE: 1

## 2017-08-15 ASSESSMENT — PATIENT HEALTH QUESTIONNAIRE - PHQ9
SUM OF ALL RESPONSES TO PHQ QUESTIONS 1-9: 6
SUM OF ALL RESPONSES TO PHQ QUESTIONS 1-9: 6
10. IF YOU CHECKED OFF ANY PROBLEMS, HOW DIFFICULT HAVE THESE PROBLEMS MADE IT FOR YOU TO DO YOUR WORK, TAKE CARE OF THINGS AT HOME, OR GET ALONG WITH OTHER PEOPLE: SOMEWHAT DIFFICULT

## 2017-08-15 NOTE — NURSING NOTE
"Chief Complaint   Patient presents with     Recheck Medication     Depression       Initial /72  Pulse 80  Temp 98.1  F (36.7  C) (Oral)  Resp 20  Wt 180 lb 6.4 oz (81.8 kg)  LMP 08/07/2017  Breastfeeding? No  BMI 26.07 kg/m2 Estimated body mass index is 26.07 kg/(m^2) as calculated from the following:    Height as of 3/8/17: 5' 9.75\" (1.772 m).    Weight as of this encounter: 180 lb 6.4 oz (81.8 kg).  Medication Reconciliation: complete   Marie Adames CMA (AAMA)      "

## 2017-08-15 NOTE — MR AVS SNAPSHOT
After Visit Summary   8/15/2017    Diana Roblero    MRN: [id number]           Patient Information     Date Of Birth          1985        Visit Information        Provider Department      8/15/2017 2:00 PM Jignesh Fine MD [facility]        Today's Diagnoses     Major depressive disorder, recurrent episode, moderate (H)    -  1    Encounter for immunization        Lactose intolerance        Migraine with aura and without status migrainosus, not intractable           Follow-ups after your visit        Follow-up notes from your care team     Return in about 6 months (around 2/15/2018).      Who to contact     If you have questions or need follow up information about today's clinic visit or your schedule please contact [facility] directly at [phone].  Normal or non-critical lab and imaging results will be communicated to you by MyChart, letter or phone within 4 business days after the clinic has received the results. If you do not hear from us within 7 days, please contact the clinic through MyChart or phone. If you have a critical or abnormal lab result, we will notify you by phone as soon as possible.  Submit refill requests through [email or web address] or call your pharmacy and they will forward the refill request to us. Please allow 3 business days for your refill to be completed.          Additional Information About Your Visit        MyChart Information     [email or web address] gives you secure access to your electronic health record. If you see a primary care provider, you can also send messages to your care team and make appointments. If you have questions, please call your primary care clinic.  If you do not have a primary care provider, please call [phone] and they will assist you.        Care EveryWhere ID     This is your Care EveryWhere ID. This could be used by other organizations to access your [place] medical records  [id number]        Your Vitals  Were     Pulse Temperature Respirations Last Period Breastfeeding? BMI (Body Mass Index)    80 98.1  F (36.7  C) (Oral) 20 08/07/2017 No 26.07 kg/m2       Blood Pressure from Last 3 Encounters:   08/15/17 106/72   03/08/17 114/78   02/27/17 116/70    Weight from Last 3 Encounters:   08/15/17 180 lb 6.4 oz (81.8 kg)   03/08/17 179 lb 4.8 oz (81.3 kg)   02/27/17 178 lb (80.7 kg)              We Performed the Following     DEPRESSION ACTION PLAN (DAP)     TDAP VACCINE (ADACEL)          Today's Medication Changes          These changes are accurate as of: 8/15/17  2:37 PM.  If you have any questions, ask your nurse or doctor.               These medicines have changed or have updated prescriptions.        Dose/Directions    escitalopram 20 MG tablet   Commonly known as:  LEXAPRO   This may have changed:  See the new instructions.   Used for:  Major depressive disorder, recurrent episode, moderate (H)   Changed by:  Jignesh Fine MD        Dose:  20 mg   Take 1 tablet (20 mg) by mouth daily   Quantity:  90 tablet   Refills:  0       ketoconazole 2 % cream   Commonly known as:  NIZORAL   This may have changed:    - when to take this  - reasons to take this   Used for:  Tinea versicolor        Apply topically 2 times daily   Quantity:  60 g   Refills:  1       topiramate 100 MG tablet   Commonly known as:  TOPAMAX   This may have changed:  See the new instructions.   Used for:  Migraine with aura and without status migrainosus, not intractable   Changed by:  Jignesh Fine MD        Dose:  100 mg   Take 1 tablet (100 mg) by mouth daily   Quantity:  90 tablet   Refills:  1            Where to get your medicines      These medications were sent to [email or web address] [id number] [place] [address] [facility] [address]  [address] [facility] [address]     Phone:  [phone]     busPIRone 10 MG tablet    escitalopram 20 MG tablet    topiramate 100 MG tablet                Primary  Care Provider Office Phone # Fax #    Jignesh Fine -[phone]       [address]  [facility] [address]        Equal Access to Services     RAQUELMARGO RIZWAN : Hadpaul demetris lora juleso Sojeni, waaxda luqadaha, qaybta kaalmada adecarolinada, rafaela johnson laAna Laurakarly dumont. So [facility] [phone].    ATENCIÓN: Si habla español, tiene a mims disposición servicios gratuitos de asistencia lingüística. Llame al [phone].    We comply with applicable federal civil rights laws and [place] laws. We do not discriminate on the basis of race, color, national origin, age, disability sex, sexual orientation or gender identity.            Thank you!     Thank you for choosing [facility]  for your care. Our goal is always to provide you with excellent care. Hearing back from our patients is one way we can continue to improve our services. Please take a few minutes to complete the written survey that you may receive in the mail after your visit with us. Thank you!             Your Updated Medication List - Protect others around you: Learn how to safely use, store and throw away your medicines at www.disposemymeds.org.          This list is accurate as of: 8/15/17  2:37 PM.  Always use your most recent med list.                   Brand Name Dispense Instructions for use Diagnosis    busPIRone 10 MG tablet    BUSPAR    540 tablet    Take 2 tablets (20 mg) by mouth 3 times daily    Major depressive disorder, recurrent episode, moderate (H)       escitalopram 20 MG tablet    LEXAPRO    90 tablet    Take 1 tablet (20 mg) by mouth daily    Major depressive disorder, recurrent episode, moderate (H)       ketoconazole 2 % cream    NIZORAL    60 g    Apply topically 2 times daily    Tinea versicolor       levonorgestrel 20 MCG/24HR IUD    MIRENA    1 each    1 each (20 mcg) by Intrauterine route once for 1 dose    Encounter for IUD insertion       SUMAtriptan 100 MG tablet    IMITREX    30  tablet    Take 1 tablet (100 mg) by mouth at onset of headache for migraine (May repeat in 2 hours if needed.  Do not exceed 2 tabs in 24 hours.)    Migraine with aura and without status migrainosus, not intractable       topiramate 100 MG tablet    TOPAMAX    90 tablet    Take 1 tablet (100 mg) by mouth daily    Migraine with aura and without status migrainosus, not intractable       VITAMIN D-3 PO      Take 2,000 Units by mouth

## 2017-08-15 NOTE — PROGRESS NOTES
History of Present Illness   Depression & Anxiety Follow-up:     Depression/Anxiety:  Depression & Anxiety    Status since last visit::  Stable    Other associated symptoms of depression and anxiety::  None    Significant life event::  No    Current substance use::  Other    Answers for HPI/ROS submitted by the patient on 8/15/2017   If you checked off any problems, how difficult have these problems made it for you to do your work, take care of things at home, or get along with other people?: Somewhat difficult  PHQ9 TOTAL SCORE: 6  CHIKIS 7 TOTAL SCORE: 14    SUBJECTIVE:                                                    Diana Roblero is a 32 year old female who presents to clinic today for the following health issues:    Medication Followup of Lexapro & Buspar    Taking Medication as prescribed: yes    Side Effects:  None    Medication Helping Symptoms:  yes       Lots of family issues, both kids involved with counseling, lots of stress.  Did recently start counseling herself.  Working on improving self care.  Concerned about irritability, realizes this isn't fair.  Does find medication helpful.  Sleep is good, no trouble falling asleep.  Naps occasionally.  New job where she is much more active and moving.      Questions about lactose intolerance.  Typically only eats butter, cheese.  Will have 2 days of gas, bloating, diarrhea after eating/drinking milk.    Review of Systems   Constitutional: Negative.    Respiratory: Negative.    Cardiovascular: Negative.    Psychiatric/Behavioral: Negative for depression. The patient is nervous/anxious. The patient does not have insomnia.          Physical Exam   Constitutional: She is oriented to person, place, and time and well-developed, well-nourished, and in no distress.   Eyes: Conjunctivae and EOM are normal.   Cardiovascular: Normal rate, regular rhythm and normal heart sounds.    Pulmonary/Chest: Effort normal and breath sounds normal.   Musculoskeletal: She  exhibits no edema.   Neurological: She is alert and oriented to person, place, and time.   Skin: Skin is warm and dry.   Psychiatric: Mood and affect normal.   Vitals reviewed.    (F33.1) Major depressive disorder, recurrent episode, moderate (H)  (primary encounter diagnosis)  Comment: scores slighlty worse, has started counseling.  Refilling.  Plan: escitalopram (LEXAPRO) 20 MG tablet, busPIRone         (BUSPAR) 10 MG tablet            (Z23) Encounter for immunization  Comment:   Plan: TDAP VACCINE (ADACEL)            (E73.9) Lactose intolerance  Comment:   Plan: avoid dairy, OTC lactase prn    (G43.109) Migraine with aura and without status migrainosus, not intractable  Comment: effective, refill  Plan: topiramate (TOPAMAX) 100 MG tablet              RTC in 6m    Jignesh Fine MD

## 2017-08-16 ASSESSMENT — PATIENT HEALTH QUESTIONNAIRE - PHQ9: SUM OF ALL RESPONSES TO PHQ QUESTIONS 1-9: 6

## 2017-08-16 ASSESSMENT — ANXIETY QUESTIONNAIRES: GAD7 TOTAL SCORE: 14

## 2017-09-05 ENCOUNTER — TRANSFERRED RECORDS (OUTPATIENT)
Dept: HEALTH INFORMATION MANAGEMENT | Facility: CLINIC | Age: 32
End: 2017-09-05

## 2017-10-14 DIAGNOSIS — F33.1 MAJOR DEPRESSIVE DISORDER, RECURRENT EPISODE, MODERATE (H): ICD-10-CM

## 2017-10-16 RX ORDER — ESCITALOPRAM OXALATE 20 MG/1
TABLET ORAL
Qty: 90 TABLET | Refills: 0
Start: 2017-10-16

## 2017-10-16 NOTE — TELEPHONE ENCOUNTER
Verified with pharmacist, had not picked up august rx yet  Deny as duplicate    Zoie Che RN, BS  Clinical Nurse Triage.

## 2017-10-16 NOTE — TELEPHONE ENCOUNTER
3 month Supply  sent 8/15/17.    escitalopram (LEXAPRO) 20 MG tablet  Sig: Take 1 tablet (20 mg) by mouth daily  Last Written Prescription Date: 8/15/17  Last Fill Quantity: 90,  # refills: 0   Last Office Visit with [facility], [facility] or [facility] prescribing provider:  8/15/2017                                              Fax sent to pharm informing above.  Please disregard request.    ZEINA Blake  October 16, 2017  10:31 AM

## 2017-12-05 ENCOUNTER — TRANSFERRED RECORDS (OUTPATIENT)
Dept: HEALTH INFORMATION MANAGEMENT | Facility: CLINIC | Age: 32
End: 2017-12-05

## 2018-01-09 ENCOUNTER — OFFICE VISIT (OUTPATIENT)
Dept: FAMILY MEDICINE | Facility: CLINIC | Age: 33
End: 2018-01-09
Payer: COMMERCIAL

## 2018-01-09 VITALS
RESPIRATION RATE: 16 BRPM | OXYGEN SATURATION: 98 % | DIASTOLIC BLOOD PRESSURE: 64 MMHG | BODY MASS INDEX: 26.33 KG/M2 | WEIGHT: 183.9 LBS | SYSTOLIC BLOOD PRESSURE: 104 MMHG | HEIGHT: 70 IN | TEMPERATURE: 98.1 F | HEART RATE: 87 BPM

## 2018-01-09 DIAGNOSIS — R10.2 PELVIC PAIN IN FEMALE: Primary | ICD-10-CM

## 2018-01-09 LAB
ALBUMIN UR-MCNC: ABNORMAL MG/DL
APPEARANCE UR: CLEAR
BACTERIA #/AREA URNS HPF: ABNORMAL /HPF
BETA HCG QUAL IFA URINE: NEGATIVE
BILIRUB UR QL STRIP: ABNORMAL
COLOR UR AUTO: YELLOW
GLUCOSE UR STRIP-MCNC: NEGATIVE MG/DL
HGB UR QL STRIP: ABNORMAL
KETONES UR STRIP-MCNC: ABNORMAL MG/DL
LEUKOCYTE ESTERASE UR QL STRIP: NEGATIVE
NITRATE UR QL: NEGATIVE
NON-SQ EPI CELLS #/AREA URNS LPF: ABNORMAL /LPF
PH UR STRIP: 6 PH (ref 5–7)
RBC #/AREA URNS AUTO: ABNORMAL /HPF
SOURCE: ABNORMAL
SP GR UR STRIP: 1.02 (ref 1–1.03)
SPECIMEN SOURCE: ABNORMAL
UROBILINOGEN UR STRIP-ACNC: 0.2 EU/DL (ref 0.2–1)
WBC #/AREA URNS AUTO: ABNORMAL /HPF
WET PREP SPEC: ABNORMAL

## 2018-01-09 PROCEDURE — 81001 URINALYSIS AUTO W/SCOPE: CPT | Performed by: FAMILY MEDICINE

## 2018-01-09 PROCEDURE — 87210 SMEAR WET MOUNT SALINE/INK: CPT | Performed by: FAMILY MEDICINE

## 2018-01-09 PROCEDURE — 99214 OFFICE O/P EST MOD 30 MIN: CPT | Performed by: FAMILY MEDICINE

## 2018-01-09 PROCEDURE — 84703 CHORIONIC GONADOTROPIN ASSAY: CPT | Performed by: FAMILY MEDICINE

## 2018-01-09 RX ORDER — CLINDAMYCIN HCL 300 MG
300 CAPSULE ORAL 2 TIMES DAILY
Qty: 14 CAPSULE | Refills: 0 | Status: SHIPPED | OUTPATIENT
Start: 2018-01-09 | End: 2018-01-16

## 2018-01-09 ASSESSMENT — ENCOUNTER SYMPTOMS
ABDOMINAL PAIN: 1
HEMATURIA: 0
CONSTIPATION: 0
FREQUENCY: 0
DIARRHEA: 0
BACK PAIN: 0
DYSURIA: 0
CONSTITUTIONAL NEGATIVE: 1
FLANK PAIN: 0

## 2018-01-09 NOTE — MR AVS SNAPSHOT
After Visit Summary   1/9/2018    Diana Roblero    MRN: [id number]           Patient Information     Date Of Birth          1985        Visit Information        Provider Department      1/9/2018 2:00 PM Jignesh Fine MD [facility]        Today's Diagnoses     Pelvic pain in female    -  1       Follow-ups after your visit        Follow-up notes from your care team     Return in about 1 month (around 2/9/2018).      Your next 10 appointments already scheduled     Feb 13, 2018  3:00 PM CST   SHORT with Jignesh Fine MD   [facility]    [address] [place] [address], [address]  [facility] [id number] [phone]              Future tests that were ordered for you today     Open Future Orders        Priority Expected Expires Ordered    US Pelvic Complete w Transvaginal Routine  1/9/2019 1/9/2018            Who to contact     If you have questions or need follow up information about today's clinic visit or your schedule please contact [facility] directly at [phone].  Normal or non-critical lab and imaging results will be communicated to you by "[employer]hart, letter or phone within 4 business days after the clinic has received the results. If you do not hear from us within 7 days, please contact the clinic through "[employer]hart or phone. If you have a critical or abnormal lab result, we will notify you by phone as soon as possible.  Submit refill requests through [employer] or call your pharmacy and they will forward the refill request to us. Please allow 3 business days for your refill to be completed.          Additional Information About Your Visit        "[employer]hart Information     [employer] gives you secure access to your electronic health record. If you see a primary care provider, you can also send messages to your care team and make appointments. If you have questions, please call your primary care clinic.  If  "you do not have a primary care provider, please call [phone] and they will assist you.        Care EveryWhere ID     This is your Care EveryWhere ID. This could be used by other organizations to access your [place] medical records  [id number]        Your Vitals Were     Pulse Temperature Respirations Height Last Period Pulse Oximetry    87 98.1  F (36.7  C) (Oral) 16 5' 9.5\" (1.765 m) 01/06/2018 98%    Breastfeeding? BMI (Body Mass Index)                No 26.77 kg/m2           Blood Pressure from Last 3 Encounters:   01/09/18 104/64   08/15/17 106/72   03/08/17 114/78    Weight from Last 3 Encounters:   01/09/18 183 lb 14.4 oz (83.4 kg)   08/15/17 180 lb 6.4 oz (81.8 kg)   03/08/17 179 lb 4.8 oz (81.3 kg)              We Performed the Following     *UA reflex to Microscopic     Beta HCG qual [facility] urine - [facility] and [facility] metabolic panel     Urine Microscopic     Wet prep          Today's Medication Changes          These changes are accurate as of: 1/9/18  3:30 PM.  If you have any questions, ask your nurse or doctor.               Start taking these medicines.        Dose/Directions    clindamycin 300 MG capsule   Commonly known as:  CLEOCIN   Used for:  Pelvic pain in female   Started by:  Jignesh Fine MD        Dose:  300 mg   Take 1 capsule (300 mg) by mouth 2 times daily for 7 days   Quantity:  14 capsule   Refills:  0            Where to get your medicines      These medications were sent to [facility] [address] [place] [address] [facility] [address] [address] [facility] [address]     Phone:  [phone]     clindamycin 300 MG capsule                Primary Care Provider Office Phone # Fax #    Jignesh Fine [phone]       [address]  [address] [facility] [address]        Equal Access to Services     JODI ASTORGA AH: Hadii demetris ku hadasho Soomaali, waaxda luqadaha, qaybta kaalmada adeegyada, rafaela cardin " nathaniel vinesjanessa laAna Laurakarly ah. So [facility] [phone].    ATENCIÓN: Si habla anish, tiene a mims disposición servicios gratuitos de asistencia lingüística. Zayra santana [phone].    We comply with applicable federal civil rights laws and [place] laws. We do not discriminate on the basis of race, color, national origin, age, disability, sex, sexual orientation, or gender identity.            Thank you!     Thank you for choosing [facility]  for your care. Our goal is always to provide you with excellent care. Hearing back from our patients is one way we can continue to improve our services. Please take a few minutes to complete the written survey that you may receive in the mail after your visit with us. Thank you!             Your Updated Medication List - Protect others around you: Learn how to safely use, store and throw away your medicines at www.disposemymeds.org.          This list is accurate as of: 1/9/18  3:30 PM.  Always use your most recent med list.                   Brand Name Dispense Instructions for use Diagnosis    busPIRone 10 MG tablet    BUSPAR    540 tablet    Take 2 tablets (20 mg) by mouth 3 times daily    Major depressive disorder, recurrent episode, moderate (H)       clindamycin 300 MG capsule    CLEOCIN    14 capsule    Take 1 capsule (300 mg) by mouth 2 times daily for 7 days    Pelvic pain in female       escitalopram 20 MG tablet    LEXAPRO    90 tablet    Take 1 tablet (20 mg) by mouth daily    Major depressive disorder, recurrent episode, moderate (H)       levonorgestrel 20 MCG/24HR IUD    MIRENA    1 each    1 each (20 mcg) by Intrauterine route once for 1 dose    Encounter for IUD insertion       SUMAtriptan 100 MG tablet    IMITREX    30 tablet    Take 1 tablet (100 mg) by mouth at onset of headache for migraine (May repeat in 2 hours if needed.  Do not exceed 2 tabs in 24 hours.)    Migraine with aura and without status migrainosus, not intractable        topiramate 100 MG tablet    TOPAMAX    90 tablet    Take 1 tablet (100 mg) by mouth daily    Migraine with aura and without status migrainosus, not intractable       VITAMIN D-3 PO      Take 2,000 Units by mouth

## 2018-01-09 NOTE — NURSING NOTE
"Chief Complaint   Patient presents with     Abdominal Cramping     several months       Initial /64  Pulse 87  Temp 98.1  F (36.7  C) (Oral)  Resp 16  Ht 5' 9.5\" (1.765 m)  Wt 183 lb 14.4 oz (83.4 kg)  LMP 01/06/2018  SpO2 98%  Breastfeeding? No  BMI 26.77 kg/m2 Estimated body mass index is 26.77 kg/(m^2) as calculated from the following:    Height as of this encounter: 5' 9.5\" (1.765 m).    Weight as of this encounter: 183 lb 14.4 oz (83.4 kg).  Medication Reconciliation: complete   Marie Adames CMA (AAMA)      "

## 2018-01-09 NOTE — PROGRESS NOTES
HPI    SUBJECTIVE:   Diana Roblero is a 32 year old female who presents to clinic today for the following health issues:    ABDOMINAL   PAIN     Onset: x several months    Description:   Character: Sharp, Stabbing and Cramping  Location: odilon-umbilical region  Radiation: None    Intensity: moderate to severe    Progression of Symptoms:  Becoming more frequent    Accompanying Signs & Symptoms:  Fever/Chills?: no   Gas/Bloating: YES- gas  Nausea: no   Vomitting: no   Diarrhea?: no   Constipation:YES- minor  Dysuria or Hematuria: no    History:   Trauma: no   Previous similar pain: no; similar to period cramps but more severe; patient has been unable to feel her IUD string    Previous tests done: none    Precipitating factors:   Does the pain change with:     Food: no      BM: no     Urination: no     Alleviating factors:  Lying down and heating pad; cold makes it worse    Therapies Tried and outcome: as documented    LMP:  1/6/18       Cramping feels menstrual.  When she has her period will have shooting pain from umbilicus towards pubic bone.  Some irregular spotting.  LMP 1/6/17.  Typically has monthly periods, but can be lighter and shorter.  Also having some RLQ stabbing pain also during period but can also happen in between.  After intercourse will have RLQ pain and low pelvic pain for a couple of hours to a day.  No pain during sex.  Has had Mirena for about one year.  Feels she has had some of this ever sice getting IUD, kept expecting it to get better.  Has never been able to find strings.      Review of Systems   Constitutional: Negative.    Gastrointestinal: Positive for abdominal pain. Negative for constipation and diarrhea.   Genitourinary: Negative.  Negative for dysuria, flank pain, frequency, hematuria and urgency.   Musculoskeletal: Negative for back pain.         Physical Exam   Constitutional: She is oriented to person, place, and time and well-developed, well-nourished, and in no distress.    Eyes: Conjunctivae and EOM are normal.   Cardiovascular: Normal rate, regular rhythm and normal heart sounds.    Pulmonary/Chest: Effort normal and breath sounds normal.   Abdominal: Soft. Bowel sounds are normal. There is no tenderness. There is no rebound and no guarding.   Genitourinary: Vagina normal, uterus normal, cervix normal, right adnexa normal, left adnexa normal and vulva normal. No vaginal discharge found.   Musculoskeletal: She exhibits no edema.   Neurological: She is alert and oriented to person, place, and time.   Skin: Skin is warm and dry.   Vitals reviewed.    (R10.2) Pelvic pain in female  (primary encounter diagnosis)  Comment: does have BV on wet prep, will treat with clinda. To r/o ovairan cyst.  If all neg than will discuss remcoing IUD  Plan: Wet prep, *UA reflex to Microscopic, Beta HCG         qual IFA urine - [facility] and [place],         Comprehensive metabolic panel, Urine         Microscopic, US Pelvic Complete w Transvaginal              RTC in 1m    Jignesh Fine MD

## 2018-01-23 ENCOUNTER — OFFICE VISIT (OUTPATIENT)
Dept: FAMILY MEDICINE | Facility: CLINIC | Age: 33
End: 2018-01-23
Payer: COMMERCIAL

## 2018-01-23 VITALS
BODY MASS INDEX: 27.65 KG/M2 | TEMPERATURE: 97.8 F | SYSTOLIC BLOOD PRESSURE: 112 MMHG | DIASTOLIC BLOOD PRESSURE: 66 MMHG | RESPIRATION RATE: 16 BRPM | HEIGHT: 69 IN | HEART RATE: 100 BPM | OXYGEN SATURATION: 100 % | WEIGHT: 186.7 LBS

## 2018-01-23 DIAGNOSIS — B30.9 ACUTE VIRAL CONJUNCTIVITIS OF LEFT EYE: Primary | ICD-10-CM

## 2018-01-23 PROCEDURE — 99214 OFFICE O/P EST MOD 30 MIN: CPT | Performed by: FAMILY MEDICINE

## 2018-01-23 ASSESSMENT — ENCOUNTER SYMPTOMS
EYE DISCHARGE: 1
BLURRED VISION: 0
FEVER: 0
GASTROINTESTINAL NEGATIVE: 1
EYE PAIN: 1
PHOTOPHOBIA: 0
SORE THROAT: 0
EYE REDNESS: 1
COUGH: 1

## 2018-01-23 NOTE — NURSING NOTE
"Chief Complaint   Patient presents with     Eye Problem     Initial /66 (BP Location: Right arm, Patient Position: Chair, Cuff Size: Adult Regular)  Pulse 100  Temp 97.8  F (36.6  C) (Oral)  Resp 16  Ht 5' 9\" (1.753 m)  Wt 186 lb 11.2 oz (84.7 kg)  LMP 01/06/2018  SpO2 100%  BMI 27.57 kg/m2 Estimated body mass index is 27.57 kg/(m^2) as calculated from the following:    Height as of this encounter: 5' 9\" (1.753 m).    Weight as of this encounter: 186 lb 11.2 oz (84.7 kg).  BP completed using cuff size regular RIGHT arm.    Lisa Magill, CMA    "

## 2018-01-23 NOTE — MR AVS SNAPSHOT
After Visit Summary   1/23/2018    Diana Roblero    MRN: [id number]           Patient Information     Date Of Birth          1985        Visit Information        Provider Department      1/23/2018 3:20 PM Jignesh Fine MD [facility]        Today's Diagnoses     Acute viral conjunctivitis of left eye    -  1       Follow-ups after your visit        Follow-up notes from your care team     Return in about 1 week (around 1/30/2018), or if symptoms worsen or fail to improve.      Your next 10 appointments already scheduled     Feb 02, 2018  1:15 PM CST   US PELVIC COMPLETE W TRANSVAGINAL with RIUS1   [facility] ([facility])    [address]  [address]  [facility] [address] [phone]           Please bring a list of your medicines (including vitamins, minerals and over-the-counter drugs). Also, tell your doctor about any allergies you may have. Wear comfortable clothes and leave your valuables at home.  Adults: Drink six 8-ounce glasses of fluid one hour before your exam. Do NOT empty your bladder.  If you need to empty your bladder before your exam, try to release only a little bit of urine. Then, drink another 8oz glass of fluid.  Children: Children who are potty trained should drink at least 4 cups (32 oz) of liquid 45 minutes to one hour prior to the exam. The child s bladder must be full in order to achieve a diagnostic exam. If your child is very uncomfortable or has an urgent need to pee, please notify a technologist; they will try to find out how much longer the wait may be and provide instructions to help relieve the pressure. Occasionally it is medically necessary to insert a urinary catheter to fill the bladder.  Please call the Imaging Department at your exam site with any questions.            Feb 13, 2018  3:00 PM CST   SHORT with Jignesh Fine MD   [facility] ([place]  "[facility]    [address], [address]  [facility] [address] [phone]              Who to contact     If you have questions or need follow up information about today's clinic visit or your schedule please contact [facility] directly at [phone].  Normal or non-critical lab and imaging results will be communicated to you by MyChart, letter or phone within 4 business days after the clinic has received the results. If you do not hear from us within 7 days, please contact the clinic through [employer] or phone. If you have a critical or abnormal lab result, we will notify you by phone as soon as possible.  Submit refill requests through [employer] or call your pharmacy and they will forward the refill request to us. Please allow 3 business days for your refill to be completed.          Additional Information About Your Visit        [employer] Information     [employer] gives you secure access to your electronic health record. If you see a primary care provider, you can also send messages to your care team and make appointments. If you have questions, please call your primary care clinic.  If you do not have a primary care provider, please call [phone] and they will assist you.        Care EveryWhere ID     This is your Care EveryWhere ID. This could be used by other organizations to access your [facility] medical records  [id number]        Your Vitals Were     Pulse Temperature Respirations Height Last Period Pulse Oximetry    100 97.8  F (36.6  C) (Oral) 16 5' 9\" (1.753 m) 01/06/2018 100%    BMI (Body Mass Index)                   27.57 kg/m2            Blood Pressure from Last 3 Encounters:   01/23/18 112/66   01/09/18 104/64   08/15/17 106/72    Weight from Last 3 Encounters:   01/23/18 186 lb 11.2 oz (84.7 kg)   01/09/18 183 lb 14.4 oz (83.4 kg)   08/15/17 180 lb 6.4 oz (81.8 kg)              Today, you had the following     No orders found for display       Primary Care " Provider Office Phone # Fax #    Jignesh J Luis Fine [phone]       [id number]  [facility] [address]        Equal Access to Services     JODI ASTORGA : Hadpaul demetris lora juleso Sosivakumarali, waaxda luqadaha, qaybta kaalmada adecarolinada, rafaela armstrong luis agail johnson laAna Laurakarly dumont. So [facility] [phone].    ATENCIÓN: Si habla español, tiene a mims disposición servicios gratuitos de asistencia lingüística. Llame al [phone].    We comply with applicable federal civil rights laws and [place] laws. We do not discriminate on the basis of race, color, national origin, age, disability, sex, sexual orientation, or gender identity.            Thank you!     Thank you for choosing [facility]  for your care. Our goal is always to provide you with excellent care. Hearing back from our patients is one way we can continue to improve our services. Please take a few minutes to complete the written survey that you may receive in the mail after your visit with us. Thank you!             Your Updated Medication List - Protect others around you: Learn how to safely use, store and throw away your medicines at www.disposemymeds.org.          This list is accurate as of: 1/23/18  3:52 PM.  Always use your most recent med list.                   Brand Name Dispense Instructions for use Diagnosis    busPIRone 10 MG tablet    BUSPAR    540 tablet    Take 2 tablets (20 mg) by mouth 3 times daily    Major depressive disorder, recurrent episode, moderate (H)       escitalopram 20 MG tablet    LEXAPRO    90 tablet    TAKE 1 TABLET BY MOUTH DAILY    Major depressive disorder, recurrent episode, moderate (H)       levonorgestrel 20 MCG/24HR IUD    MIRENA    1 each    1 each (20 mcg) by Intrauterine route once for 1 dose    Encounter for IUD insertion       SUMAtriptan 100 MG tablet    IMITREX    30 tablet    Take 1 tablet (100 mg) by mouth at onset of headache for migraine (May repeat in 2 hours if needed.  Do  not exceed 2 tabs in 24 hours.)    Migraine with aura and without status migrainosus, not intractable       topiramate 100 MG tablet    TOPAMAX    90 tablet    Take 1 tablet (100 mg) by mouth daily    Migraine with aura and without status migrainosus, not intractable       VITAMIN D-3 PO      Take 2,000 Units by mouth

## 2018-01-23 NOTE — PROGRESS NOTES
HPI   SUBJECTIVE:   Diana Roblero is a 32 year old female who presents to clinic today for the following health issues:    Eye(s) Problem  Onset: 4 days ago    Description:   Location: left  Pain: no  Redness: no    Accompanying Signs & Symptoms:  Discharge/mattering: YES  Swelling: YES  Visual changes: no  Fever: no  Nasal Congestion: YES  Bothered by bright lights: no    History:   Trauma: no   Foreign body exposure: no    Precipitating factors:   Wearing contacts: no    Alleviating factors:  Improved by: has not tried anything     Therapies Tried and outcome: HAS NOT TRIED ANYTHING     Was rubbing her L eye, felt a tearing sensation and feels she could see a tear or line of clear film across eye.  Did swell markedly and get a bit red.  No blurriness in vision, no photophobia, pain with eye movements, although notes a HA that evening.  Even by the next day swelling was much better and has continued to slowly resolve.  Did have a bit of mattering the next day, but not since.  Has had a cough/stuff nose for the last two days.  Not feeling too terribly ill.  No fever, sore throat, n/v.  Wears, glasses, does not wear contacts.          Review of Systems   Constitutional: Negative for fever and malaise/fatigue.   HENT: Positive for congestion. Negative for sore throat.    Eyes: Positive for pain, discharge and redness. Negative for blurred vision and photophobia.   Respiratory: Positive for cough.    Gastrointestinal: Negative.          Physical Exam   Constitutional: She is well-developed, well-nourished, and in no distress. No distress.   HENT:   Right Ear: Tympanic membrane, external ear and ear canal normal.   Left Ear: Tympanic membrane, external ear and ear canal normal.   Mouth/Throat: Oropharynx is clear and moist. No oropharyngeal exudate.   Eyes: Conjunctivae and EOM are normal. Pupils are equal, round, and reactive to light. Left eye exhibits no chemosis, no discharge and no exudate. Left  conjunctiva is not injected. Left conjunctiva has no hemorrhage.   Slit lamp exam:       The left eye shows no corneal abrasion, no foreign body, no hyphema and no fluorescein uptake.   Cardiovascular: Normal rate, regular rhythm and normal heart sounds.    Pulmonary/Chest: Effort normal and breath sounds normal.   Lymphadenopathy:     She has no cervical adenopathy.   Skin: Skin is warm and dry. No rash noted.   Nursing note and vitals reviewed.    (B30.9) Acute viral conjunctivitis of left eye  (primary encounter diagnosis)  Comment: very unlikly that she traumatized conunctiva, if nothing else wwould expect a conjunctival hemorrhage, and no sign.  Suspect she had mild viral conjunctivitis with current URI and saw a thread of exudate across her eye.  Normal exam today  Plan: observe, RTC immed for pain, vision loss, photophobia      RTC in 1w prn    Jignesh Fine MD

## 2018-02-02 ENCOUNTER — RADIANT APPOINTMENT (OUTPATIENT)
Dept: ULTRASOUND IMAGING | Facility: CLINIC | Age: 33
End: 2018-02-02
Attending: FAMILY MEDICINE
Payer: COMMERCIAL

## 2018-02-02 DIAGNOSIS — R10.2 PELVIC PAIN IN FEMALE: ICD-10-CM

## 2018-02-02 PROCEDURE — 76830 TRANSVAGINAL US NON-OB: CPT | Performed by: OBSTETRICS & GYNECOLOGY

## 2018-02-02 PROCEDURE — 76856 US EXAM PELVIC COMPLETE: CPT | Performed by: OBSTETRICS & GYNECOLOGY

## 2018-02-05 ENCOUNTER — MYC MEDICAL ADVICE (OUTPATIENT)
Dept: FAMILY MEDICINE | Facility: CLINIC | Age: 33
End: 2018-02-05

## 2018-02-05 DIAGNOSIS — N83.291 COMPLEX CYST OF RIGHT OVARY: Primary | ICD-10-CM

## 2018-02-13 ENCOUNTER — OFFICE VISIT (OUTPATIENT)
Dept: FAMILY MEDICINE | Facility: CLINIC | Age: 33
End: 2018-02-13
Payer: COMMERCIAL

## 2018-02-13 VITALS
WEIGHT: 186 LBS | DIASTOLIC BLOOD PRESSURE: 68 MMHG | TEMPERATURE: 98 F | SYSTOLIC BLOOD PRESSURE: 108 MMHG | HEART RATE: 110 BPM | HEIGHT: 69 IN | OXYGEN SATURATION: 97 % | BODY MASS INDEX: 27.55 KG/M2

## 2018-02-13 DIAGNOSIS — F33.1 MAJOR DEPRESSIVE DISORDER, RECURRENT EPISODE, MODERATE (H): ICD-10-CM

## 2018-02-13 DIAGNOSIS — Z13.220 LIPID SCREENING: ICD-10-CM

## 2018-02-13 DIAGNOSIS — Z23 NEED FOR PROPHYLACTIC VACCINATION AND INOCULATION AGAINST INFLUENZA: Primary | ICD-10-CM

## 2018-02-13 DIAGNOSIS — G43.109 MIGRAINE WITH AURA AND WITHOUT STATUS MIGRAINOSUS, NOT INTRACTABLE: ICD-10-CM

## 2018-02-13 DIAGNOSIS — J01.11 ACUTE RECURRENT FRONTAL SINUSITIS: ICD-10-CM

## 2018-02-13 PROCEDURE — 80061 LIPID PANEL: CPT | Performed by: FAMILY MEDICINE

## 2018-02-13 PROCEDURE — 36415 COLL VENOUS BLD VENIPUNCTURE: CPT | Performed by: FAMILY MEDICINE

## 2018-02-13 PROCEDURE — 99214 OFFICE O/P EST MOD 30 MIN: CPT | Performed by: FAMILY MEDICINE

## 2018-02-13 RX ORDER — BUSPIRONE HYDROCHLORIDE 10 MG/1
20 TABLET ORAL 3 TIMES DAILY
Qty: 540 TABLET | Refills: 1 | Status: SHIPPED | OUTPATIENT
Start: 2018-02-13 | End: 2019-02-06

## 2018-02-13 RX ORDER — ESCITALOPRAM OXALATE 20 MG/1
20 TABLET ORAL DAILY
Qty: 90 TABLET | Refills: 1 | Status: SHIPPED | OUTPATIENT
Start: 2018-02-13 | End: 2019-01-13

## 2018-02-13 RX ORDER — AMOXICILLIN AND CLAVULANATE POTASSIUM 500; 125 MG/1; MG/1
1 TABLET, FILM COATED ORAL 3 TIMES DAILY
Qty: 30 TABLET | Refills: 0 | Status: SHIPPED | OUTPATIENT
Start: 2018-02-13 | End: 2018-03-12

## 2018-02-13 RX ORDER — TOPIRAMATE 100 MG/1
100 TABLET, FILM COATED ORAL DAILY
Qty: 90 TABLET | Refills: 1 | Status: SHIPPED | OUTPATIENT
Start: 2018-02-13 | End: 2018-08-10

## 2018-02-13 ASSESSMENT — ANXIETY QUESTIONNAIRES
1. FEELING NERVOUS, ANXIOUS, OR ON EDGE: MORE THAN HALF THE DAYS
6. BECOMING EASILY ANNOYED OR IRRITABLE: SEVERAL DAYS
GAD7 TOTAL SCORE: 8
IF YOU CHECKED OFF ANY PROBLEMS ON THIS QUESTIONNAIRE, HOW DIFFICULT HAVE THESE PROBLEMS MADE IT FOR YOU TO DO YOUR WORK, TAKE CARE OF THINGS AT HOME, OR GET ALONG WITH OTHER PEOPLE: SOMEWHAT DIFFICULT
7. FEELING AFRAID AS IF SOMETHING AWFUL MIGHT HAPPEN: MORE THAN HALF THE DAYS
3. WORRYING TOO MUCH ABOUT DIFFERENT THINGS: SEVERAL DAYS
2. NOT BEING ABLE TO STOP OR CONTROL WORRYING: SEVERAL DAYS
5. BEING SO RESTLESS THAT IT IS HARD TO SIT STILL: NOT AT ALL

## 2018-02-13 ASSESSMENT — ENCOUNTER SYMPTOMS
CARDIOVASCULAR NEGATIVE: 1
CONSTITUTIONAL NEGATIVE: 1
SINUS PAIN: 1
DEPRESSION: 0
NERVOUS/ANXIOUS: 0
HEADACHES: 1
INSOMNIA: 0
ABDOMINAL PAIN: 1
RESPIRATORY NEGATIVE: 1

## 2018-02-13 ASSESSMENT — PATIENT HEALTH QUESTIONNAIRE - PHQ9: 5. POOR APPETITE OR OVEREATING: SEVERAL DAYS

## 2018-02-13 NOTE — NURSING NOTE
"Chief Complaint   Patient presents with     Depression     6 mo follow up       Initial /68 (BP Location: Right arm, Patient Position: Chair, Cuff Size: Adult Large)  Pulse 110  Temp 98  F (36.7  C) (Oral)  Ht 5' 9.25\" (1.759 m)  Wt 186 lb (84.4 kg)  LMP 01/06/2018  SpO2 97%  Breastfeeding? No  BMI 27.27 kg/m2 Estimated body mass index is 27.27 kg/(m^2) as calculated from the following:    Height as of this encounter: 5' 9.25\" (1.759 m).    Weight as of this encounter: 186 lb (84.4 kg).  Medication Reconciliation: complete   Marie Adames CMA (AAMA)  "

## 2018-02-13 NOTE — MR AVS SNAPSHOT
After Visit Summary   2/13/2018    Diana Roblero    MRN: [id number]           Patient Information     Date Of Birth          1985        Visit Information        Provider Department      2/13/2018 3:00 PM Jignesh Fine MD [facility]        Today's Diagnoses     Need for prophylactic vaccination and inoculation against influenza    -  1    Migraine with aura and without status migrainosus, not intractable        Major depressive disorder, recurrent episode, moderate (H)        Lipid screening        Acute recurrent frontal sinusitis           Follow-ups after your visit        Follow-up notes from your care team     Return in about 6 months (around 8/13/2018).      Who to contact     If you have questions or need follow up information about today's clinic visit or your schedule please contact [facility] directly at [phone].  Normal or non-critical lab and imaging results will be communicated to you by MyChart, letter or phone within 4 business days after the clinic has received the results. If you do not hear from us within 7 days, please contact the clinic through MyChart or phone. If you have a critical or abnormal lab result, we will notify you by phone as soon as possible.  Submit refill requests through [employer] or call your pharmacy and they will forward the refill request to us. Please allow 3 business days for your refill to be completed.          Additional Information About Your Visit        MyChart Information     [employer] gives you secure access to your electronic health record. If you see a primary care provider, you can also send messages to your care team and make appointments. If you have questions, please call your primary care clinic.  If you do not have a primary care provider, please call [phone] and they will assist you.        Care EveryWhere ID     This is your Care EveryWhere ID. This could be used by other  "organizations to access your [facility] medical records  [id number]        Your Vitals Were     Pulse Temperature Height Last Period Pulse Oximetry Breastfeeding?    110 98  F (36.7  C) (Oral) 5' 9.25\" (1.759 m) 01/06/2018 97% No    BMI (Body Mass Index)                   27.27 kg/m2            Blood Pressure from Last 3 Encounters:   02/13/18 108/68   01/23/18 112/66   01/09/18 104/64    Weight from Last 3 Encounters:   02/13/18 186 lb (84.4 kg)   01/23/18 186 lb 11.2 oz (84.7 kg)   01/09/18 183 lb 14.4 oz (83.4 kg)              We Performed the Following     Lipid panel reflex to direct LDL Fasting          Today's Medication Changes          These changes are accurate as of 2/13/18  3:28 PM.  If you have any questions, ask your nurse or doctor.               Start taking these medicines.        Dose/Directions    amoxicillin-clavulanate 500-125 MG per tablet   Commonly known as:  AUGMENTIN   Used for:  Acute recurrent frontal sinusitis   Started by:  Jignesh Fine MD        Dose:  1 tablet   Take 1 tablet by mouth 3 times daily   Quantity:  30 tablet   Refills:  0         These medicines have changed or have updated prescriptions.        Dose/Directions    escitalopram 20 MG tablet   Commonly known as:  LEXAPRO   This may have changed:  See the new instructions.   Used for:  Major depressive disorder, recurrent episode, moderate (H)   Changed by:  Jignesh Fine MD        Dose:  20 mg   Take 1 tablet (20 mg) by mouth daily   Quantity:  90 tablet   Refills:  1            Where to get your medicines      These medications were sent to [facility] [id number] [place] [address] [address] [address] [address]     Phone:  [phone]     amoxicillin-clavulanate 500-125 MG per tablet    busPIRone 10 MG tablet    escitalopram 20 MG tablet    topiramate 100 MG tablet                Primary Care Provider Office Phone # Fax #    Jignesh Dietz " MD [place] [phone]       [id number]  [facility] [address]        Equal Access to Services     JODI ASTORGA : Hadii aad ku hadrejijose Acosta, wabettyda dickannetteha, cheldavid kaannyda luis anegrita, rafaela stephiein hayaatata gunngail johnson fercho dumont. So [facility] [phone].    ATENCIÓN: Si habla español, tiene a mims disposición servicios gratuitos de asistencia lingüística. Llame al [phone].    We comply with applicable federal civil rights laws and [place] laws. We do not discriminate on the basis of race, color, national origin, age, disability, sex, sexual orientation, or gender identity.            Thank you!     Thank you for choosing [facility]  for your care. Our goal is always to provide you with excellent care. Hearing back from our patients is one way we can continue to improve our services. Please take a few minutes to complete the written survey that you may receive in the mail after your visit with us. Thank you!             Your Updated Medication List - Protect others around you: Learn how to safely use, store and throw away your medicines at www.disposemymeds.org.          This list is accurate as of 2/13/18  3:28 PM.  Always use your most recent med list.                   Brand Name Dispense Instructions for use Diagnosis    amoxicillin-clavulanate 500-125 MG per tablet    AUGMENTIN    30 tablet    Take 1 tablet by mouth 3 times daily    Acute recurrent frontal sinusitis       busPIRone 10 MG tablet    BUSPAR    540 tablet    Take 2 tablets (20 mg) by mouth 3 times daily    Major depressive disorder, recurrent episode, moderate (H)       escitalopram 20 MG tablet    LEXAPRO    90 tablet    Take 1 tablet (20 mg) by mouth daily    Major depressive disorder, recurrent episode, moderate (H)       levonorgestrel 20 MCG/24HR IUD    MIRENA    1 each    1 each (20 mcg) by Intrauterine route once for 1 dose    Encounter for IUD insertion       SUMAtriptan 100 MG tablet    IMITREX     30 tablet    Take 1 tablet (100 mg) by mouth at onset of headache for migraine (May repeat in 2 hours if needed.  Do not exceed 2 tabs in 24 hours.)    Migraine with aura and without status migrainosus, not intractable       topiramate 100 MG tablet    TOPAMAX    90 tablet    Take 1 tablet (100 mg) by mouth daily    Migraine with aura and without status migrainosus, not intractable       VITAMIN D-3 PO      Take 2,000 Units by mouth

## 2018-02-13 NOTE — PROGRESS NOTES
History of Present Illness     Depression & Anxiety Follow-up:     Depression/Anxiety:  Depression & Anxiety    Status since last visit::  Stable    Other associated symptoms of depression and anxiety::  YES    Significant life event::  YES    Current substance use::  None       Today's PHQ-9         PHQ-9 Total Score:         PHQ-9 Q9 Suicidal ideation:       Thoughts of suicide or self harm:      Self-harm Plan:        Self-harm Action:          Safety concerns for self or others:            Migraines:     Headache Symptoms are:  Worsening    Migraine frequency::  5 per week    Migraine Duration::  >3 days    Ability to perform ADL's::  No    Migraine Rescue/Relief Medications::  Ibuprofen (Advil, Motrin), Tylenol, Excedrin and Sumatriptan (Imitrex)    Effectiveness of rescue/relief medications::  No relief    Migraine Preventative Medications::  Topomax    Neurological symptoms::  None    ER or UC Visits::  None    Diet:  Regular (no restrictions)  Frequency of exercise:  4-5 days/week  Duration of exercise:  15-30 minutes  Taking medications regularly:  Yes  Medication side effects:  None  Additional concerns today:  YES      PHQ-9 7/19/2016 11/1/2016 8/15/2017   Total Score 4 3 6   Q9: Suicide Ideation Not at all Not at all Not at all     In the past two weeks have you had thoughts of suicide or self-harm?  No.    Do you have concerns about your personal safety or the safety of others?   No  PHQ-9  English  PHQ-9   Any Language  Suicide Assessment Five-step Evaluation and Treatment (SAFE-T)  Answers for HPI/ROS submitted by the patient on 2/13/2018   PHQ-2 Score: 2    Here mostly to follow up on her mood.    Is c/o daily HA, but comments that these are NOT migraines.  Mostly symmetrically across the front of her forehead, no visual effects or auras.  Intensity is moderate.  Does have some nasal congestion and ears hurt.  Was treated for sinus infection about 1m ago.  Feels that sx were a little better  afterwards.      Mood is OK, but notes that niece (whom she has custody of), has been in an inpatient treatment for mood for the last week or so.  Feels that she is doing well with her modd apart from this.  Is satsified with her mood.    Has not yet been contacted by GYN, still having pelvic pain.      Review of Systems   Constitutional: Negative.    HENT: Positive for ear pain and sinus pain.    Respiratory: Negative.    Cardiovascular: Negative.    Gastrointestinal: Positive for abdominal pain.   Neurological: Positive for headaches.   Psychiatric/Behavioral: Negative for depression. The patient is not nervous/anxious and does not have insomnia.          Physical Exam   Constitutional: She is oriented to person, place, and time and well-developed, well-nourished, and in no distress.   Eyes: Conjunctivae and EOM are normal.   Cardiovascular: Normal rate, regular rhythm and normal heart sounds.    Pulmonary/Chest: Effort normal and breath sounds normal.   Musculoskeletal: She exhibits no edema.   Neurological: She is alert and oriented to person, place, and time.   Skin: Skin is warm and dry.   Psychiatric: Mood and affect normal.   Vitals reviewed.    (G43.109) Migraine with aura and without status migrainosus, not intractable  Comment: refill  Plan: topiramate (TOPAMAX) 100 MG tablet            (F33.1) Major depressive disorder, recurrent episode, moderate (H)  Comment: well controlled, refill  Plan: busPIRone (BUSPAR) 10 MG tablet, escitalopram         (LEXAPRO) 20 MG tablet            (Z13.220) Lipid screening  Comment: is fasting  Plan: Lipid panel reflex to direct LDL Fasting            (J01.11) Acute recurrent frontal sinusitis  Comment: still having frontal HA and nasal congestion  Plan: amoxicillin-clavulanate (AUGMENTIN) 500-125 MG         per tablet              RTC in 1m    Jignesh Fine MD

## 2018-02-14 ASSESSMENT — PATIENT HEALTH QUESTIONNAIRE - PHQ9: SUM OF ALL RESPONSES TO PHQ QUESTIONS 1-9: 7

## 2018-02-14 ASSESSMENT — ANXIETY QUESTIONNAIRES: GAD7 TOTAL SCORE: 8

## 2018-02-15 LAB
CHOLEST SERPL-MCNC: 141 MG/DL
HDLC SERPL-MCNC: 38 MG/DL
LDLC SERPL CALC-MCNC: 82 MG/DL
NONHDLC SERPL-MCNC: 103 MG/DL
TRIGL SERPL-MCNC: 107 MG/DL

## 2018-02-16 ENCOUNTER — MYC MEDICAL ADVICE (OUTPATIENT)
Dept: FAMILY MEDICINE | Facility: CLINIC | Age: 33
End: 2018-02-16

## 2018-02-16 DIAGNOSIS — R10.2 PELVIC PAIN IN FEMALE: Primary | ICD-10-CM

## 2018-02-16 RX ORDER — TRAMADOL HYDROCHLORIDE 50 MG/1
50 TABLET ORAL EVERY 12 HOURS
Qty: 30 TABLET | Refills: 0 | Status: SHIPPED | OUTPATIENT
Start: 2018-02-16 | End: 2018-03-12

## 2018-02-16 NOTE — TELEPHONE ENCOUNTER
Note from 1.9.18 OV:  (R10.2) Pelvic pain in female  (primary encounter diagnosis)  Comment: does have BV on wet prep, will treat with clinda. To r/o ovairan cyst.  If all neg than will discuss remcoing IUD  Plan: Wet prep, *UA reflex to Microscopic, Beta HCG         qual IFA urine - [facility] and [place],         Comprehensive metabolic panel, Urine         Microscopic, US Pelvic Complete w Transvaginal    Please review mychart message   Respond directly to pt if appropriate  Zoie Che RN BS

## 2018-02-16 NOTE — TELEPHONE ENCOUNTER
Called patient and advised script is ready to be picked up at the .      Darcie PATINO    02/16/18 1:30 PM

## 2018-03-12 ENCOUNTER — OFFICE VISIT (OUTPATIENT)
Dept: OBGYN | Facility: CLINIC | Age: 33
End: 2018-03-12
Payer: COMMERCIAL

## 2018-03-12 VITALS
SYSTOLIC BLOOD PRESSURE: 106 MMHG | BODY MASS INDEX: 27.25 KG/M2 | HEIGHT: 69 IN | WEIGHT: 184 LBS | DIASTOLIC BLOOD PRESSURE: 70 MMHG

## 2018-03-12 DIAGNOSIS — R10.2 PELVIC PAIN IN FEMALE: ICD-10-CM

## 2018-03-12 DIAGNOSIS — N83.201 RIGHT OVARIAN CYST: Primary | ICD-10-CM

## 2018-03-12 PROCEDURE — 99214 OFFICE O/P EST MOD 30 MIN: CPT | Performed by: OBSTETRICS & GYNECOLOGY

## 2018-03-12 NOTE — NURSING NOTE
"Chief Complaint   Patient presents with     Pelvic Pain     U/S 18       Initial /70 (BP Location: Left arm, Patient Position: Chair, Cuff Size: Adult Large)  Ht 5' 9.25\" (1.759 m)  Wt 184 lb (83.5 kg)  BMI 26.98 kg/m2 Estimated body mass index is 26.98 kg/(m^2) as calculated from the following:    Height as of this encounter: 5' 9.25\" (1.759 m).    Weight as of this encounter: 184 lb (83.5 kg).  BP completed using cuff size: large        The following HM Due: NONE      Ethel Amaro CMA           "

## 2018-03-12 NOTE — MR AVS SNAPSHOT
After Visit Summary   3/12/2018    Diana Roblero    MRN: [id number]           Patient Information     Date Of Birth          1985        Visit Information        Provider Department      3/12/2018 9:00 AM Federica Harris DO [facility]        Today's Diagnoses     Right ovarian cyst    -  1    Pelvic pain in female           Follow-ups after your visit        Follow-up notes from your care team     Return if symptoms worsen or fail to improve.      Future tests that were ordered for you today     Open Future Orders        Priority Expected Expires Ordered    US Pelvic Complete w Transvaginal Routine  3/12/2019 3/12/2018            Who to contact     If you have questions or need follow up information about today's clinic visit or your schedule please contact [facility] directly at [phone].  Normal or non-critical lab and imaging results will be communicated to you by MyChart, letter or phone within 4 business days after the clinic has received the results. If you do not hear from us within 7 days, please contact the clinic through [employer] or phone. If you have a critical or abnormal lab result, we will notify you by phone as soon as possible.  Submit refill requests through [employer] or call your pharmacy and they will forward the refill request to us. Please allow 3 business days for your refill to be completed.          Additional Information About Your Visit        MyChart Information     [employer] gives you secure access to your electronic health record. If you see a primary care provider, you can also send messages to your care team and make appointments. If you have questions, please call your primary care clinic.  If you do not have a primary care provider, please call [phone] and they will assist you.        Care EveryWhere ID     This is your Care EveryWhere ID. This could be used by other organizations to access your [facility]  "records  [id number]        Your Vitals Were     Height BMI (Body Mass Index)                5' 9.25\" (1.759 m) 26.98 kg/m2           Blood Pressure from Last 3 Encounters:   03/12/18 106/70   02/13/18 108/68   01/23/18 112/66    Weight from Last 3 Encounters:   03/12/18 184 lb (83.5 kg)   02/13/18 186 lb (84.4 kg)   01/23/18 186 lb 11.2 oz (84.7 kg)                 Today's Medication Changes          These changes are accurate as of 3/12/18 10:48 AM.  If you have any questions, ask your nurse or doctor.               Stop taking these medicines if you haven't already. Please contact your care team if you have questions.     amoxicillin-clavulanate 500-125 MG per tablet   Commonly known as:  AUGMENTIN   Stopped by:  Federica Harris,            traMADol 50 MG tablet   Commonly known as:  ULTRAM   Stopped by:  Federica Harris,                     Primary Care Provider Office Phone # Fax #    Jignesh J Luis Fine [phone]       [id number]  [address] [facility] [address]        Equal Access to Services     [facility] AH: Hadii aad ku hadasho Soomaali, waaxda luqadaha, qaybta kaalmada adeegyada, waxay stephiein haypoonamn kendall brantley ah. So [facility] [phone].    ATENCIÓN: Si habla español, tiene a mims disposición servicios gratuitos de asistencia lingüística. Zayra al [phone].    We comply with applicable federal civil rights laws and [place] laws. We do not discriminate on the basis of race, color, national origin, age, disability, sex, sexual orientation, or gender identity.            Thank you!     Thank you for choosing [facility]  for your care. Our goal is always to provide you with excellent care. Hearing back from our patients is one way we can continue to improve our services. Please take a few minutes to complete the written survey that you may receive in the mail after your visit with us. Thank you!             Your Updated Medication List - Protect others " around you: Learn how to safely use, store and throw away your medicines at www.disposemymeds.org.          This list is accurate as of 3/12/18 10:48 AM.  Always use your most recent med list.                   Brand Name Dispense Instructions for use Diagnosis    busPIRone 10 MG tablet    BUSPAR    540 tablet    Take 2 tablets (20 mg) by mouth 3 times daily    Major depressive disorder, recurrent episode, moderate (H)       escitalopram 20 MG tablet    LEXAPRO    90 tablet    Take 1 tablet (20 mg) by mouth daily    Major depressive disorder, recurrent episode, moderate (H)       levonorgestrel 20 MCG/24HR IUD    MIRENA    1 each    1 each (20 mcg) by Intrauterine route once for 1 dose    Encounter for IUD insertion       SUMAtriptan 100 MG tablet    IMITREX    30 tablet    Take 1 tablet (100 mg) by mouth at onset of headache for migraine (May repeat in 2 hours if needed.  Do not exceed 2 tabs in 24 hours.)    Migraine with aura and without status migrainosus, not intractable       topiramate 100 MG tablet    TOPAMAX    90 tablet    Take 1 tablet (100 mg) by mouth daily    Migraine with aura and without status migrainosus, not intractable       VITAMIN D-3 PO      Take 2,000 Units by mouth

## 2018-03-12 NOTE — PROGRESS NOTES
"  SUBJECTIVE:                                                   Diana Roblero is a 32 year old female who presents to clinic today for the following health issue(s):  Patient presents with:  Pelvic Pain: U/S 18  Chronic pelvic pain that has resolved.   Right ovarian cyst on ultrasound.     HPI:  Patient had pelvic pain for 8 months.   Had a Mirena IUD placed 2017, over a year ago. For awhile, she felt the pain was her body \"getting used to the IUD.\"   Was having \"period cramps\" that would come and go. Initially pain was associated with her period and later it started lasting 4-5 days out of each week.  Heat and lying down were the only alleviating factors. Ibuprofen mildly helpful. Had intermittent diarrhea and constipation (resolved).   Has had sharp pain on the right side as well as shooting pain from umbilicus to her pubic bone.  Saw her family medicine physician.  Had ultrasound which showed a small right ovarian cyst.   Took Clindamycin for Bacterial Vaginosis ~4-5 weeks ago. Felt mild improvement after treatment.   Recently took Amoxacillin for sinusitis and after finishing the antibiotics, pain stopped. Has been pain free for 2 weeks.    No LMP recorded. Patient is not currently having periods (Reason: IUD).  Patient is sexually active, . Single partner, .  Using Mirena IUD for contraception.   STI history: Admits to history of chlamydia several years ago.    Problem list and histories reviewed & adjusted, as indicated.  Additional history: as documented.    Patient Active Problem List   Diagnosis     Migraine with aura and without status migrainosus, not intractable     Tobacco abuse     Major depressive disorder, recurrent episode, moderate (H)     No past surgical history on file.   Social History   Substance Use Topics     Smoking status: Current Every Day Smoker     Packs/day: 1.00     Types: Cigarettes     Smokeless tobacco: Never Used     Alcohol use 0.6 oz/week     1 " "Glasses of wine, 0 Standard drinks or equivalent per week      Problem (# of Occurrences) Relation (Name,Age of Onset)    Anxiety Disorder (1) Niece    Depression (1) Niece              Current Outpatient Prescriptions on File Prior to Visit:  topiramate (TOPAMAX) 100 MG tablet Take 1 tablet (100 mg) by mouth daily   busPIRone (BUSPAR) 10 MG tablet Take 2 tablets (20 mg) by mouth 3 times daily   escitalopram (LEXAPRO) 20 MG tablet Take 1 tablet (20 mg) by mouth daily   Cholecalciferol (VITAMIN D-3 PO) Take 2,000 Units by mouth   levonorgestrel (MIRENA) 20 MCG/24HR IUD 1 each (20 mcg) by Intrauterine route once for 1 dose   SUMAtriptan (IMITREX) 100 MG tablet Take 1 tablet (100 mg) by mouth at onset of headache for migraine (May repeat in 2 hours if needed.  Do not exceed 2 tabs in 24 hours.) (Patient not taking: Reported on 3/12/2018)     No current facility-administered medications on file prior to visit.   Allergies   Allergen Reactions     Flagyl [Metronidazole Hcl] Nausea and Vomiting       ROS:  12 point ROS negative except as noted above in HPI, including Gen., Resp., CV, GI &  system review.    OBJECTIVE:     /70 (BP Location: Left arm, Patient Position: Chair, Cuff Size: Adult Large)  Ht 5' 9.25\" (1.759 m)  Wt 184 lb (83.5 kg)  BMI 26.98 kg/m2   BMI: Body mass index is 26.98 kg/(m^2).  General: Alert and oriented, no distress.  Psychiatric: Mood and affect within normal limits.  Skin: Warm and dry, no lesions, rashes or discolorations.    Declined examination today  In-Clinic Test Results:  No results found for this or any previous visit (from the past 24 hour(s)).    1/9/2018 negative for pregnancy, urine negative, wet prep positive for bacterial vaginosis (treated)    Ultrasound 2/2/2018    CLINICAL INFORMATION      Indications for ultrasound: Pelvic pain      LMP: 06 Jan 2018    Hormones: IUD      Measurements:  Uterus: 6.6 x 3.1 x 4.2 cm.     Position is anteverted.  Contour is " smth/reg.      Endo cav: 6.0 mm         Smooth/regular/wnl  Cervix: Wnl      Right ovary: 3.7 x 2.0 x 2.3 cm. Complex cyst 2.0 x 1.5 x 1.7cm  Left ovary: 2.6 x 1.5 x 1.8 cm. Wnl      Cul de sac: no free fluid      ===================================  Complete pelvic ultrasound using realtime   transabdominal and transvaginal scanning        Small complex cyst of R ovary; otherwise, normal      -pelvic sonographic findings  Suggest short term follow up (2-3 months) to assure resolution         Note: IUD in utero        Ellen BENNETT          ASSESSMENT/PLAN:                                                        ICD-10-CM    1. Right ovarian cyst N83.201 US Pelvic Complete w Transvaginal   2. Pelvic pain in female R10.2        Patient's pain resolved after treatment with amoxicillin for sinusitis.  She has had no pelvic pain for the last 2 weeks.  Declines pelvic exam today including STI screening.  This visit was spent providing counseling on the differential for pelvic pain.  We discussed the possibility of IBS given the intermittent diarrhea and constipation.  We also discussed symptoms, physiology, diagnosis, and treatment of endometriosis.  She does have a history of chlamydia over states she has had no new sexual partners.  Patient would like to schedule a follow-up appointment if the pain returns.    She did have a complex right ovarian cyst 2.0 x 1.5 x 1.7cm seen on ultrasound on 2/2/2018.  We will plan to reevaluate bilateral ovaries on repeat scan in mid April.    Total face to face time 25 minutes, with > 50% spent counseling and/or coordination of care.      Federica Harris,   [facility]

## 2018-04-03 ENCOUNTER — TRANSFERRED RECORDS (OUTPATIENT)
Dept: HEALTH INFORMATION MANAGEMENT | Facility: CLINIC | Age: 33
End: 2018-04-03

## 2018-04-10 ENCOUNTER — RADIANT APPOINTMENT (OUTPATIENT)
Dept: ULTRASOUND IMAGING | Facility: CLINIC | Age: 33
End: 2018-04-10
Payer: COMMERCIAL

## 2018-04-10 DIAGNOSIS — N83.201 RIGHT OVARIAN CYST: ICD-10-CM

## 2018-04-10 PROCEDURE — 76830 TRANSVAGINAL US NON-OB: CPT | Performed by: OBSTETRICS & GYNECOLOGY

## 2018-04-10 PROCEDURE — 76856 US EXAM PELVIC COMPLETE: CPT | Performed by: OBSTETRICS & GYNECOLOGY

## 2018-08-01 ENCOUNTER — TRANSFERRED RECORDS (OUTPATIENT)
Dept: HEALTH INFORMATION MANAGEMENT | Facility: CLINIC | Age: 33
End: 2018-08-01

## 2018-08-10 DIAGNOSIS — G43.109 MIGRAINE WITH AURA AND WITHOUT STATUS MIGRAINOSUS, NOT INTRACTABLE: ICD-10-CM

## 2018-08-10 NOTE — TELEPHONE ENCOUNTER
"Requested Prescriptions   Pending Prescriptions Disp Refills     topiramate (TOPAMAX) 100 MG tablet [Pharmacy Med Name: TOPIRAMATE 100MG TABLETS] 90 tablet 0    Last Written Prescription Date:  2/13/18  Last Fill Quantity: 90,  # refills: 1   Last Office Visit: 2/13/2018   Future Office Visit:      Sig: TAKE 1 TABLET BY MOUTH DAILY    Anti-Seizure Meds Protocol  Failed    8/10/2018  3:46 AM       Failed - Review Authorizing provider's last note.     Refer to last progress notes: confirm request is for original authorizing provider (cannot be through other providers).         Failed - Normal CBC on file in past 26 months    Recent Labs   Lab Test  11/21/15   0309   WBC  9.0   RBC  4.51   HGB  14.3   HCT  39.1   PLT  324       For [facility] ONLY: [id number] = WBC, [id number] = RBC         Failed - Normal ALT or AST on file in past 26 months    Recent Labs   Lab Test  11/03/15   1347   ALT  19     Recent Labs   Lab Test  11/03/15   1347   AST  11            Failed - Normal platelet count on file in past 26 months    Recent Labs   Lab Test  11/21/15   0309   PLT  324              Passed - Recent (12 mo) or future (30 days) visit within the authorizing provider's specialty    Patient had office visit in the last 12 months or has a visit in the next 30 days with authorizing provider or within the authorizing provider's specialty.  See \"Patient Info\" tab in inbasket, or \"Choose Columns\" in Meds & Orders section of the refill encounter.           Passed - No active pregnancy on record       Passed - No positive pregnancy test in last 12 months          "

## 2018-08-14 RX ORDER — TOPIRAMATE 100 MG/1
TABLET, FILM COATED ORAL
Qty: 30 TABLET | Refills: 0 | Status: SHIPPED | OUTPATIENT
Start: 2018-08-14 | End: 2018-09-10

## 2018-08-14 NOTE — TELEPHONE ENCOUNTER
Telephone message left.  [employer] message with PHQ-9 sent.  Marie Adames CMA ([facility])

## 2018-08-14 NOTE — TELEPHONE ENCOUNTER
Due for PHQ-9 and Follow up appointment   Please call and set up and ask questions.  Will refill for 1 mnth  Thank you

## 2018-08-14 NOTE — TELEPHONE ENCOUNTER
Routing refill request to provider for review/approval because:  Labs not current:  CBC, ALT/AST, PLT  Mychart sent requesting 6 month med appt    Kalie Ordoñez RN, BSN

## 2018-08-14 NOTE — TELEPHONE ENCOUNTER
Pt returned call, given message below. She will schedule OV for Med Check via linette Rivas   08/14/18 3:28 PM

## 2018-09-10 DIAGNOSIS — G43.109 MIGRAINE WITH AURA AND WITHOUT STATUS MIGRAINOSUS, NOT INTRACTABLE: ICD-10-CM

## 2018-09-10 NOTE — TELEPHONE ENCOUNTER
"topiramate (TOPAMAX) 100 MG tablet  Last Written Prescription Date:  08/14/18  Last Fill Quantity: 30,  # refills: 0   Last office visit: 2/13/2018 with prescribing provider:  Rody     Future Office Visit:      Requested Prescriptions   Pending Prescriptions Disp Refills     topiramate (TOPAMAX) 100 MG tablet 30 tablet 0     Sig: Take 1 tablet (100 mg) by mouth daily    Anti-Seizure Meds Protocol  Failed    9/10/2018  7:22 AM       Failed - Review Authorizing provider's last note.     Refer to last progress notes: confirm request is for original authorizing provider (cannot be through other providers).         Failed - Normal CBC on file in past 26 months    Recent Labs   Lab Test  11/21/15   0309   WBC  9.0   RBC  4.51   HGB  14.3   HCT  39.1   PLT  324       For [facility] ONLY: [id number] = WBC, ISHE712 = RBC         Failed - Normal ALT or AST on file in past 26 months    Recent Labs   Lab Test  11/03/15   1347   ALT  19     Recent Labs   Lab Test  11/03/15   1347   AST  11            Failed - Normal platelet count on file in past 26 months    Recent Labs   Lab Test  11/21/15   0309   PLT  324              Passed - Recent (12 mo) or future (30 days) visit within the authorizing provider's specialty    Patient had office visit in the last 12 months or has a visit in the next 30 days with authorizing provider or within the authorizing provider's specialty.  See \"Patient Info\" tab in inbasket, or \"Choose Columns\" in Meds & Orders section of the refill encounter.           Passed - No active pregnancy on record       Passed - No positive pregnancy test in last 12 months          "

## 2018-09-11 RX ORDER — TOPIRAMATE 100 MG/1
100 TABLET, FILM COATED ORAL DAILY
Qty: 90 TABLET | Refills: 1 | Status: SHIPPED | OUTPATIENT
Start: 2018-09-11 | End: 2019-02-06

## 2018-09-11 NOTE — TELEPHONE ENCOUNTER
Routing refill request to provider for review/approval because:  Sharron given x1 and patient did not follow up, please advise  Labs not current:  CBC,ALT/AST/PLT  Kalie Ordoñez RN, BSN

## 2019-01-13 DIAGNOSIS — F33.1 MAJOR DEPRESSIVE DISORDER, RECURRENT EPISODE, MODERATE (H): ICD-10-CM

## 2019-01-14 NOTE — TELEPHONE ENCOUNTER
Patient due for 6 month medication check.    Left a detailed message on voice mail to call the clinic back and schedule an appointment.    Jeri Wheatley RN

## 2019-01-14 NOTE — TELEPHONE ENCOUNTER
"Requested Prescriptions   Pending Prescriptions Disp Refills     escitalopram (LEXAPRO) 20 MG tablet [Pharmacy Med Name: ESCITALOPRAM 20MG TABLETS] 90 tablet 0    Last Written Prescription Date:  2/13/18  Last Fill Quantity: 90,  # refills: 1   Last Office Visit: 2/13/2018 Fine      Return in about 6 months (around 8/13/2018).     Future Office Visit:      Sig: TAKE 1 TABLET BY MOUTH DAILY    SSRIs Protocol Failed - 1/13/2019  3:46 AM       Failed - PHQ-9 score less than 5 in past 6 months    PHQ-9 SCORE 11/1/2016 8/15/2017 2/13/2018   PHQ-9 Total Score MyChart - 6 (Mild depression) -   PHQ-9 Total Score 3 6 7     CHIKIS-7 SCORE 11/1/2016 8/15/2017 2/13/2018   Total Score - 14 (moderate anxiety) -   Total Score 8 14 8            Failed - Recent (6 mo) or future (30 days) visit within the authorizing provider's specialty    Patient had office visit in the last 6 months or has a visit in the next 30 days with authorizing provider or within the authorizing provider's specialty.  See \"Patient Info\" tab in inbasket, or \"Choose Columns\" in Meds & Orders section of the refill encounter.           Passed - Medication is active on med list       Passed - Patient is age 18 or older       Passed - No active pregnancy on record       Passed - No positive pregnancy test in last 12 months        "

## 2019-01-16 RX ORDER — ESCITALOPRAM OXALATE 20 MG/1
TABLET ORAL
Qty: 90 TABLET | Refills: 0 | Status: SHIPPED | OUTPATIENT
Start: 2019-01-16 | End: 2019-02-06

## 2019-01-16 NOTE — TELEPHONE ENCOUNTER
Call out to pt, scheduled appt for 1 wk out, will be out of medication prior to appt  Will approve 1 fill      Next 5 appointments (look out 90 days)    Jan 23, 2019 11:40 AM CST  SHORT with Jignesh Fine MD  [facility] [address] [place] [address], [address]  [facility] [address] [phone]         Zoie Che RN, BS  Clinical Nurse Triage.

## 2019-02-06 ENCOUNTER — OFFICE VISIT (OUTPATIENT)
Dept: FAMILY MEDICINE | Facility: CLINIC | Age: 34
End: 2019-02-06
Payer: COMMERCIAL

## 2019-02-06 VITALS
TEMPERATURE: 98 F | WEIGHT: 183.5 LBS | BODY MASS INDEX: 26.27 KG/M2 | DIASTOLIC BLOOD PRESSURE: 72 MMHG | HEART RATE: 84 BPM | SYSTOLIC BLOOD PRESSURE: 110 MMHG | HEIGHT: 70 IN | RESPIRATION RATE: 20 BRPM

## 2019-02-06 DIAGNOSIS — F33.1 MAJOR DEPRESSIVE DISORDER, RECURRENT EPISODE, MODERATE (H): Primary | ICD-10-CM

## 2019-02-06 DIAGNOSIS — J32.9 CHRONIC CONGESTION OF PARANASAL SINUS: ICD-10-CM

## 2019-02-06 DIAGNOSIS — G43.109 MIGRAINE WITH AURA AND WITHOUT STATUS MIGRAINOSUS, NOT INTRACTABLE: ICD-10-CM

## 2019-02-06 PROCEDURE — 99214 OFFICE O/P EST MOD 30 MIN: CPT | Performed by: FAMILY MEDICINE

## 2019-02-06 RX ORDER — TOPIRAMATE 100 MG/1
100 TABLET, FILM COATED ORAL DAILY
Qty: 90 TABLET | Refills: 1 | Status: SHIPPED | OUTPATIENT
Start: 2019-02-06 | End: 2019-09-14

## 2019-02-06 RX ORDER — BUSPIRONE HYDROCHLORIDE 10 MG/1
20 TABLET ORAL 3 TIMES DAILY
Qty: 540 TABLET | Refills: 1 | Status: SHIPPED | OUTPATIENT
Start: 2019-02-06

## 2019-02-06 RX ORDER — ESCITALOPRAM OXALATE 20 MG/1
20 TABLET ORAL DAILY
Qty: 90 TABLET | Refills: 0 | Status: SHIPPED | OUTPATIENT
Start: 2019-02-06 | End: 2019-06-26

## 2019-02-06 ASSESSMENT — ENCOUNTER SYMPTOMS
CONSTITUTIONAL NEGATIVE: 1
INSOMNIA: 0
NERVOUS/ANXIOUS: 0
RESPIRATORY NEGATIVE: 1
DEPRESSION: 0
CARDIOVASCULAR NEGATIVE: 1
SINUS PAIN: 1

## 2019-02-06 ASSESSMENT — PATIENT HEALTH QUESTIONNAIRE - PHQ9
SUM OF ALL RESPONSES TO PHQ QUESTIONS 1-9: 3
10. IF YOU CHECKED OFF ANY PROBLEMS, HOW DIFFICULT HAVE THESE PROBLEMS MADE IT FOR YOU TO DO YOUR WORK, TAKE CARE OF THINGS AT HOME, OR GET ALONG WITH OTHER PEOPLE: SOMEWHAT DIFFICULT
SUM OF ALL RESPONSES TO PHQ QUESTIONS 1-9: 3

## 2019-02-06 ASSESSMENT — ANXIETY QUESTIONNAIRES
GAD7 TOTAL SCORE: 5
3. WORRYING TOO MUCH ABOUT DIFFERENT THINGS: SEVERAL DAYS
6. BECOMING EASILY ANNOYED OR IRRITABLE: SEVERAL DAYS
1. FEELING NERVOUS, ANXIOUS, OR ON EDGE: SEVERAL DAYS
5. BEING SO RESTLESS THAT IT IS HARD TO SIT STILL: NOT AT ALL
GAD7 TOTAL SCORE: 5
7. FEELING AFRAID AS IF SOMETHING AWFUL MIGHT HAPPEN: NOT AT ALL
GAD7 TOTAL SCORE: 5
2. NOT BEING ABLE TO STOP OR CONTROL WORRYING: SEVERAL DAYS
4. TROUBLE RELAXING: SEVERAL DAYS

## 2019-02-06 ASSESSMENT — MIFFLIN-ST. JEOR: SCORE: 1608.63
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
## 2019-02-08 ASSESSMENT — ANXIETY QUESTIONNAIRES: GAD7 TOTAL SCORE: 5

## 2019-02-08 ASSESSMENT — PATIENT HEALTH QUESTIONNAIRE - PHQ9: SUM OF ALL RESPONSES TO PHQ QUESTIONS 1-9: 3

## 2019-03-15 DIAGNOSIS — G43.109 MIGRAINE WITH AURA AND WITHOUT STATUS MIGRAINOSUS, NOT INTRACTABLE: ICD-10-CM

## 2019-03-15 NOTE — TELEPHONE ENCOUNTER
"Requested Prescriptions   Pending Prescriptions Disp Refills     topiramate (TOPAMAX) 100 MG tablet [Pharmacy Med Name: TOPIRAMATE 100MG TABLETS]  Last Written Prescription Date:  02/06/2019  Last Fill Quantity: 90,  # refills: 1   Last office visit: 2/6/2019 with prescribing provider:  02/06/2019   Future Office Visit:     90 tablet 0     Sig: TAKE 1 TABLET BY MOUTH DAILY.    Anti-Seizure Meds Protocol  Failed - 3/15/2019  3:46 AM       Failed - Review Authorizing provider's last note.     Refer to last progress notes: confirm request is for original authorizing provider (cannot be through other providers).         Failed - Normal CBC on file in past 26 months    Recent Labs   Lab Test 11/21/15  0309   WBC 9.0   RBC 4.51   HGB 14.3   HCT 39.1                   Failed - Normal ALT or AST on file in past 26 months    Recent Labs   Lab Test 11/03/15  1347   ALT 19     Recent Labs   Lab Test 11/03/15  1347   AST 11            Failed - Normal platelet count on file in past 26 months    Recent Labs   Lab Test 11/21/15  0309                 Passed - Recent (12 mo) or future (30 days) visit within the authorizing provider's specialty    Patient had office visit in the last 12 months or has a visit in the next 30 days with authorizing provider or within the authorizing provider's specialty.  See \"Patient Info\" tab in inbasket, or \"Choose Columns\" in Meds & Orders section of the refill encounter.             Passed - Medication is active on med list       Passed - No active pregnancy on record       Passed - No positive pregnancy test in last 12 months          "

## 2019-03-19 RX ORDER — TOPIRAMATE 100 MG/1
TABLET, FILM COATED ORAL
Qty: 90 TABLET | Refills: 0 | OUTPATIENT
Start: 2019-03-19

## 2019-03-19 NOTE — TELEPHONE ENCOUNTER
Duplicate  E-Prescribing Status: Receipt confirmed by pharmacy (2/6/2019  1:58 PM CST)  Kalie Ordoñez RN, BSN

## 2019-04-29 ENCOUNTER — OFFICE VISIT (OUTPATIENT)
Dept: FAMILY MEDICINE | Facility: CLINIC | Age: 34
End: 2019-04-29
Payer: COMMERCIAL

## 2019-04-29 VITALS
TEMPERATURE: 98.1 F | SYSTOLIC BLOOD PRESSURE: 120 MMHG | HEART RATE: 92 BPM | RESPIRATION RATE: 20 BRPM | DIASTOLIC BLOOD PRESSURE: 70 MMHG | BODY MASS INDEX: 26.89 KG/M2 | WEIGHT: 186.1 LBS

## 2019-04-29 DIAGNOSIS — F33.1 MAJOR DEPRESSIVE DISORDER, RECURRENT EPISODE, MODERATE (H): Primary | ICD-10-CM

## 2019-04-29 PROCEDURE — 99213 OFFICE O/P EST LOW 20 MIN: CPT | Performed by: FAMILY MEDICINE

## 2019-04-29 ASSESSMENT — ANXIETY QUESTIONNAIRES
6. BECOMING EASILY ANNOYED OR IRRITABLE: NEARLY EVERY DAY
IF YOU CHECKED OFF ANY PROBLEMS ON THIS QUESTIONNAIRE, HOW DIFFICULT HAVE THESE PROBLEMS MADE IT FOR YOU TO DO YOUR WORK, TAKE CARE OF THINGS AT HOME, OR GET ALONG WITH OTHER PEOPLE: EXTREMELY DIFFICULT
5. BEING SO RESTLESS THAT IT IS HARD TO SIT STILL: SEVERAL DAYS
1. FEELING NERVOUS, ANXIOUS, OR ON EDGE: NEARLY EVERY DAY
3. WORRYING TOO MUCH ABOUT DIFFERENT THINGS: NEARLY EVERY DAY
7. FEELING AFRAID AS IF SOMETHING AWFUL MIGHT HAPPEN: NEARLY EVERY DAY
2. NOT BEING ABLE TO STOP OR CONTROL WORRYING: NEARLY EVERY DAY
GAD7 TOTAL SCORE: 19

## 2019-04-29 ASSESSMENT — ENCOUNTER SYMPTOMS
INSOMNIA: 1
CONSTITUTIONAL NEGATIVE: 1
DEPRESSION: 1
DOUBLE VISION: 0
SHORTNESS OF BREATH: 0
BLURRED VISION: 0
NERVOUS/ANXIOUS: 1
HEADACHES: 0
PALPITATIONS: 0

## 2019-04-29 ASSESSMENT — PATIENT HEALTH QUESTIONNAIRE - PHQ9
SUM OF ALL RESPONSES TO PHQ QUESTIONS 1-9: 14
5. POOR APPETITE OR OVEREATING: NEARLY EVERY DAY

## 2019-04-29 ASSESSMENT — LIFESTYLE VARIABLES: SUBSTANCE_ABUSE: 0

## 2019-04-29 NOTE — PROGRESS NOTES
HPI    SUBJECTIVE:   Diana Roblero is a 34 year old female who presents to clinic today for the following   health issues:      Medication Followup of Lexapro & Buspar    Taking Medication as prescribed: yes    Side Effects:  None    Medication Helping Symptoms:  Unknown if Buspar is working; situation at work is not good       Consult to discuss DAVID    Difficulty with children.  Daughter in treatment, accused brother of sexual abuse.  Son is living out of the house.  Lots of stress, lots of services involved with life.  Also having some issues at work, conflict with boss.  Anxiety is much higher, especially around work.  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  Looking into leave.    Had been using buspar as PRN, with regular dose at bedtime.  Recently started using TID again, not sure how much it's working.  Is seeing counselor, but had kind of been on hiatus and will be starting again soon.  Especially with leave.    Review of Systems   Constitutional: Negative.    Eyes: Negative for blurred vision and double vision.   Respiratory: Negative for shortness of breath.    Cardiovascular: Negative for chest pain and palpitations.   Neurological: Negative for headaches.   Psychiatric/Behavioral: Positive for depression. Negative for substance abuse and suicidal ideas. The patient is nervous/anxious and has insomnia.          Physical Exam   Constitutional: She is oriented to person, place, and time. She appears distressed.   Neurological: She is alert and oriented to person, place, and time.   Skin: Skin is warm and dry.   Psychiatric: Her behavior is normal. Her mood appears anxious. Her affect is labile.     (F33.1) Major depressive disorder, recurrent episode, moderate (H)  (primary encounter diagnosis)  Comment: lots of life stressors  Plan: leave letter provided, declines ativan at this time - will be restarting with counseling, workig on solutios for daughter, may call back for this        RTC in 1m, sooner if  needed    Jignesh Fine MD

## 2019-04-29 NOTE — LETTER
[address], [address]  [facility] [address]  Phone: [phone]  Fax: [phone]    April 29, 2019        Diana Roblero  [address] [place] [id number]          To whom it may concern:    RE: Diana Curryleonel    Patient was seen and treated today at our clinic.  Patient may return to work 5/13/19 with the following:  No working or lifting restrictions    Please contact me for questions or concerns.      Sincerely,        Jignesh Fine MD

## 2019-04-30 ASSESSMENT — ANXIETY QUESTIONNAIRES: GAD7 TOTAL SCORE: 19

## 2019-05-03 ENCOUNTER — TRANSFERRED RECORDS (OUTPATIENT)
Dept: HEALTH INFORMATION MANAGEMENT | Facility: CLINIC | Age: 34
End: 2019-05-03

## 2019-05-13 ENCOUNTER — TELEPHONE (OUTPATIENT)
Dept: FAMILY MEDICINE | Facility: CLINIC | Age: 34
End: 2019-05-13

## 2019-05-13 NOTE — TELEPHONE ENCOUNTER
Patient calling regarding FMLA forms.       Have these been received?  Patient states she needs these completed by tomorrow.    Please call patient JERROD Avelar

## 2019-05-14 NOTE — TELEPHONE ENCOUNTER
Reason for Call:  Form, our goal is to have forms completed with 72 hours, however, some forms may require a visit or additional information.    Type of letter, form or note:  FMLA    Who is the form from?: Patient    Where did the form come from: Patient or family brought in       What clinic location was the form placed at?: [facility]     Where the form was placed: Given to MA/RN    What number is listed as a contact on the form?: call pt [phone]        Additional comments: pt needs form completed JERROD Rivas   05/14/19 2:40 PM

## 2019-05-14 NOTE — TELEPHONE ENCOUNTER
When I saw Pt several weeks ago I arranged for her to have two weeks leave, returning to work without restrictions 5/13.  Is the [place] form to confirm this prior leave, or does she need it to say something different?    Jignesh Fine MD

## 2019-05-16 NOTE — TELEPHONE ENCOUNTER
Left detailed message for patient to return call and let us know how to proceed.  Marie Adames CMA ([facility])

## 2019-05-17 NOTE — TELEPHONE ENCOUNTER
Faxed form to [phone], then forms were sent to [employer] and a temporary copy is kept in a folder at the [place] [facility]. LVM for pt that a copy for her is at the  for      Alfredo Rivas   05/17/19 10:57 AM

## 2019-06-19 ENCOUNTER — TRANSFERRED RECORDS (OUTPATIENT)
Dept: HEALTH INFORMATION MANAGEMENT | Facility: CLINIC | Age: 34
End: 2019-06-19

## 2019-06-26 DIAGNOSIS — F33.1 MAJOR DEPRESSIVE DISORDER, RECURRENT EPISODE, MODERATE (H): ICD-10-CM

## 2019-06-26 NOTE — TELEPHONE ENCOUNTER
"Requested Prescriptions   Pending Prescriptions Disp Refills     escitalopram (LEXAPRO) 20 MG tablet [Pharmacy Med Name: ESCITALOPRAM 20MG TABLETS] 90 tablet 0     Sig: TAKE 1 TABLET(20 MG) BY MOUTH DAILY   Last Written Prescription Date:  2/6/19  Last Fill Quantity: 90,  # refills: 0   Last Office Visit: 4/29/2019 Fine  [facility] in 1m, sooner if needed.    Future Office Visit:         SSRIs Protocol Passed - 6/26/2019  3:45 AM        Passed - PHQ-9 score less than 5 in past 6 months     PHQ-9 SCORE 2/13/2018 2/6/2019 4/29/2019   PHQ-9 Total Score MyChart - 3 (Minimal depression) -   PHQ-9 Total Score 7 3 14     CHIKIS-7 SCORE 2/13/2018 2/6/2019 4/29/2019   Total Score - 5 (mild anxiety) -   Total Score 8 5 19               Passed - Medication is active on med list        Passed - Patient is age 18 or older        Passed - No active pregnancy on record        Passed - No positive pregnancy test in last 12 months        Passed - Recent (6 mo) or future (30 days) visit within the authorizing provider's specialty     Patient had office visit in the last 6 months or has a visit in the next 30 days with authorizing provider or within the authorizing provider's specialty.  See \"Patient Info\" tab in inbasket, or \"Choose Columns\" in Meds & Orders section of the refill encounter.            "

## 2019-06-28 RX ORDER — ESCITALOPRAM OXALATE 20 MG/1
TABLET ORAL
Qty: 30 TABLET | Refills: 0 | Status: SHIPPED | OUTPATIENT
Start: 2019-06-28

## 2019-06-28 NOTE — TELEPHONE ENCOUNTER
Seen in April with PCP; was to f/u in 1 month.  Please call to help schedule.  Refilled x 1 month.    Deja Richards CNP

## 2019-06-28 NOTE — TELEPHONE ENCOUNTER
Pt is now seeing a psychiatrist is taking over all medication refills    Writer called to pharmacy and cancelled refill     Liseth Eckert RN

## 2019-06-28 NOTE — TELEPHONE ENCOUNTER
Routing refill request to provider for review/approval because:  Labs out of range:  PHQ/CHIKIS  Kalie Ordoñez RN, BSN

## 2019-07-02 ENCOUNTER — TELEPHONE (OUTPATIENT)
Dept: FAMILY MEDICINE | Facility: CLINIC | Age: 34
End: 2019-07-02

## 2019-07-02 NOTE — TELEPHONE ENCOUNTER
Dr. Fine:    Dr. Martinez from [facility] called to gather some information/feedback from you regarding the Pt's need for her leave of absence and how her medical condition causes impairment in her daily function.   Dr. Martinez is currently involved the disability appeal for the Pt.     She would need to speak with you about any OV's or assessment on or after 4-23-19. This would need to be a doctor to doctor conversation.     She can be reached at [phone].     Christal Florez RN -- [facility]

## 2019-07-12 NOTE — TELEPHONE ENCOUNTER
Spoke with other MD.  Questions regarding exam and diagnosis, letter and back to work dates, treatments offered.    Jignesh Fine MD

## 2019-09-10 ENCOUNTER — TELEPHONE (OUTPATIENT)
Dept: FAMILY MEDICINE | Facility: CLINIC | Age: 34
End: 2019-09-10

## 2019-09-10 NOTE — LETTER
[address], [address]  [facility] [id number] [phone]  September 17, 2019    Diana Roblero  [address] [place] [address]      Dear Diana,    I care about your health and have reviewed your health plan.  I have reviewed your medical conditions, medication list, and lab results and am making recommendations  based on this review, to better manage your health.    You are particularly in need of attention regarding:  -Depression and -Wellness (Physical) Visit    I am recommending that you:  -schedule a WELLNESS (Physical) APPOINTMENT with me.   I will check fasting labs the same day - nothing to eat except water and meds for 8-10 hours prior.      Here is a list of Health Maintenance topics that are due now or due soon:  Health Maintenance Due   Topic Date Due     PREVENTIVE CARE VISIT  02/27/2018     INFLUENZA VACCINE (1) 09/01/2019     PAP  02/27/2020       Please call us at [phone] (or use [employer] to address the above   recommendations.    Thank you for trusting [facility] and we appreciate the opportunity to serve you.  We look forward to supporting your healthcare needs in the future.    Healthy Regards,    Jignesh Fine MD

## 2019-09-10 NOTE — TELEPHONE ENCOUNTER
Panel Management Review      Patient has the following on her problem list:     Depression / Dysthymia review    Measure:  Needs PHQ-9 score of 4 or less during index window.  Administer PHQ-9 and if score is 5 or more, send encounter to provider for next steps.    5 - 7 month window range: 8/30/2019 - 12/28/2019    PHQ-9 SCORE 2/13/2018 2/6/2019 4/29/2019   PHQ-9 Total Score MyChart - 3 (Minimal depression) -   PHQ-9 Total Score 7 3 14       If PHQ-9 recheck is 5 or more, route to provider for next steps.    Patient is due for:  PHQ9      Composite cancer screening  Chart review shows that this patient is due/due soon for the following None  Summary:    Patient is due/failing the following:   PHQ9    Action needed:   Patient needs office visit for depression and anxiety follow up.    Type of outreach:    Sent [employer] message.    Questions for provider review:    None                                                                                                                                    Emmy Cash       Chart routed to Care Team .

## 2019-11-06 ENCOUNTER — HEALTH MAINTENANCE LETTER (OUTPATIENT)
Age: 34
End: 2019-11-06

## 2020-03-11 ENCOUNTER — TELEPHONE (OUTPATIENT)
Dept: FAMILY MEDICINE | Facility: CLINIC | Age: 35
End: 2020-03-11

## 2020-03-11 NOTE — TELEPHONE ENCOUNTER
Panel Management Review      Patient has the following on her problem list:     Depression / Dysthymia review    Measure:  Needs PHQ-9 score of 4 or less during index window.  Administer PHQ-9 and if score is 5 or more, send encounter to provider for next steps.    5 - 7 month window range: PHQ-9 due NOW    PHQ-9 SCORE 2/13/2018 2/6/2019 4/29/2019   PHQ-9 Total Score MyChart - 3 (Minimal depression) -   PHQ-9 Total Score 7 3 14       If PHQ-9 recheck is 5 or more, route to provider for next steps.    Patient is due for:  PHQ9      Composite cancer screening  Chart review shows that this patient is due/due soon for the following None  Summary:    Patient is due/failing the following:   PHQ9    Action needed:   Patient needs to do PHQ9.    Type of outreach:    Sent [employer] message.    Questions for provider review:    None                                                                                                                                    Lisa Magill, [facility]       Chart routed to Care Team .

## 2020-03-18 NOTE — TELEPHONE ENCOUNTER
Patient called back, was at work so unable to answer questions at this time. Will check My Chart and answer them and send back.    Jo Merrill MA

## 2020-06-16 ENCOUNTER — TELEPHONE (OUTPATIENT)
Dept: FAMILY MEDICINE | Facility: CLINIC | Age: 35
End: 2020-06-16

## 2020-06-16 NOTE — TELEPHONE ENCOUNTER
Panel Management Review      Patient has the following on her problem list:     Depression / Dysthymia review    Measure:  Needs PHQ-9 score of 4 or less during index window.  Administer PHQ-9 and if score is 5 or more, send encounter to provider for next steps.    5 - 7 month window range: PHQ-9 due NOW    PHQ-9 SCORE 2/13/2018 2/6/2019 4/29/2019   PHQ-9 Total Score MyChart - 3 (Minimal depression) -   PHQ-9 Total Score 7 3 14       If PHQ-9 recheck is 5 or more, route to provider for next steps.    Patient is due for:  PHQ9      Composite cancer screening  Chart review shows that this patient is due/due soon for the following None  Summary:    Patient is due/failing the following:   PHQ9    Action needed:   Patient needs to do PHQ9.    Type of outreach:    Sent [employer] message.    Questions for provider review:    None                                                                                                                                    Lisa Magill, [facility]       Chart routed to Care Team .

## 2020-06-17 ENCOUNTER — TELEPHONE (OUTPATIENT)
Dept: FAMILY MEDICINE | Facility: CLINIC | Age: 35
End: 2020-06-17

## 2020-06-17 NOTE — TELEPHONE ENCOUNTER
Pt called and messaged left regarding updating PHQ and CHIKIS assessments. Pt instructed to call clinic back or sign into her MyChart to complete them at her earliest convenience.      Erika SAGEEMT

## 2020-06-25 ASSESSMENT — ANXIETY QUESTIONNAIRES
5. BEING SO RESTLESS THAT IT IS HARD TO SIT STILL: NOT AT ALL
1. FEELING NERVOUS, ANXIOUS, OR ON EDGE: MORE THAN HALF THE DAYS
7. FEELING AFRAID AS IF SOMETHING AWFUL MIGHT HAPPEN: NOT AT ALL
3. WORRYING TOO MUCH ABOUT DIFFERENT THINGS: SEVERAL DAYS
6. BECOMING EASILY ANNOYED OR IRRITABLE: SEVERAL DAYS
GAD7 TOTAL SCORE: 8
IF YOU CHECKED OFF ANY PROBLEMS ON THIS QUESTIONNAIRE, HOW DIFFICULT HAVE THESE PROBLEMS MADE IT FOR YOU TO DO YOUR WORK, TAKE CARE OF THINGS AT HOME, OR GET ALONG WITH OTHER PEOPLE: SOMEWHAT DIFFICULT
2. NOT BEING ABLE TO STOP OR CONTROL WORRYING: MORE THAN HALF THE DAYS

## 2020-06-25 ASSESSMENT — PATIENT HEALTH QUESTIONNAIRE - PHQ9
5. POOR APPETITE OR OVEREATING: MORE THAN HALF THE DAYS
SUM OF ALL RESPONSES TO PHQ QUESTIONS 1-9: 5

## 2020-06-25 NOTE — TELEPHONE ENCOUNTER
Called patient.  PHQ-9 and CHIKIS-7 questions completed over the phone.    PHQ 2/6/2019 4/29/2019 6/25/2020   PHQ-9 Total Score 3 14 5   Q9: Thoughts of better off dead/self-harm past 2 weeks Not at all Not at all Not at all     CHIKIS-7 SCORE 2/6/2019 4/29/2019 6/25/2020   Total Score 5 (mild anxiety) - -   Total Score 5 19 8     Lisa Magill, [facility]

## 2020-06-26 ASSESSMENT — ANXIETY QUESTIONNAIRES: GAD7 TOTAL SCORE: 8

## 2020-11-29 ENCOUNTER — HEALTH MAINTENANCE LETTER (OUTPATIENT)
Age: 35
End: 2020-11-29

## 2021-04-07 ENCOUNTER — ANCILLARY PROCEDURE (OUTPATIENT)
Dept: GENERAL RADIOLOGY | Facility: CLINIC | Age: 36
End: 2021-04-07
Attending: FAMILY MEDICINE
Payer: COMMERCIAL

## 2021-04-07 ENCOUNTER — OFFICE VISIT (OUTPATIENT)
Dept: FAMILY MEDICINE | Facility: CLINIC | Age: 36
End: 2021-04-07
Payer: COMMERCIAL

## 2021-04-07 VITALS
HEART RATE: 100 BPM | RESPIRATION RATE: 12 BRPM | BODY MASS INDEX: 28.06 KG/M2 | SYSTOLIC BLOOD PRESSURE: 120 MMHG | WEIGHT: 196 LBS | DIASTOLIC BLOOD PRESSURE: 70 MMHG | HEIGHT: 70 IN | TEMPERATURE: 98.2 F

## 2021-04-07 DIAGNOSIS — M25.521 RIGHT ELBOW PAIN: ICD-10-CM

## 2021-04-07 DIAGNOSIS — J06.9 UPPER RESPIRATORY TRACT INFECTION, UNSPECIFIED TYPE: ICD-10-CM

## 2021-04-07 DIAGNOSIS — B36.0 TINEA VERSICOLOR: ICD-10-CM

## 2021-04-07 DIAGNOSIS — M25.521 RIGHT ELBOW PAIN: Primary | ICD-10-CM

## 2021-04-07 LAB
SARS-COV-2 RNA RESP QL NAA+PROBE: NORMAL
SPECIMEN SOURCE: NORMAL

## 2021-04-07 PROCEDURE — 73080 X-RAY EXAM OF ELBOW: CPT | Mod: RT | Performed by: RADIOLOGY

## 2021-04-07 PROCEDURE — 99214 OFFICE O/P EST MOD 30 MIN: CPT | Performed by: FAMILY MEDICINE

## 2021-04-07 PROCEDURE — U0003 INFECTIOUS AGENT DETECTION BY NUCLEIC ACID (DNA OR RNA); SEVERE ACUTE RESPIRATORY SYNDROME CORONAVIRUS 2 (SARS-COV-2) (CORONAVIRUS DISEASE [COVID-19]), AMPLIFIED PROBE TECHNIQUE, MAKING USE OF HIGH THROUGHPUT TECHNOLOGIES AS DESCRIBED BY CMS-2020-01-R: HCPCS | Performed by: FAMILY MEDICINE

## 2021-04-07 PROCEDURE — U0005 INFEC AGEN DETEC AMPLI PROBE: HCPCS | Performed by: FAMILY MEDICINE

## 2021-04-07 RX ORDER — FLUTICASONE PROPIONATE 50 MCG
1 SPRAY, SUSPENSION (ML) NASAL DAILY
Qty: 16 G | Refills: 0 | Status: SHIPPED | OUTPATIENT
Start: 2021-04-07 | End: 2021-06-03

## 2021-04-07 RX ORDER — KETOCONAZOLE 20 MG/G
CREAM TOPICAL DAILY
Qty: 60 G | Refills: 0 | Status: SHIPPED | OUTPATIENT
Start: 2021-04-07 | End: 2021-08-31

## 2021-04-07 ASSESSMENT — PATIENT HEALTH QUESTIONNAIRE - PHQ9
SUM OF ALL RESPONSES TO PHQ QUESTIONS 1-9: 3
10. IF YOU CHECKED OFF ANY PROBLEMS, HOW DIFFICULT HAVE THESE PROBLEMS MADE IT FOR YOU TO DO YOUR WORK, TAKE CARE OF THINGS AT HOME, OR GET ALONG WITH OTHER PEOPLE: NOT DIFFICULT AT ALL
SUM OF ALL RESPONSES TO PHQ QUESTIONS 1-9: 3

## 2021-04-07 ASSESSMENT — MIFFLIN-ST. JEOR: SCORE: 1646.36

## 2021-04-07 ASSESSMENT — ENCOUNTER SYMPTOMS
ARTHRALGIAS: 1
FEVER: 0

## 2021-04-07 NOTE — PATIENT INSTRUCTIONS
Patient Education     Understanding Biceps Tendonitis (Distal)    The biceps is the muscle on the front of the upper arm. Biceps tendons are connective tissue that attaches this muscle to the bones of the shoulder and arm. Overuse or a sudden injury to tendons can cause pain. When this problem occurs at the inner elbow, it's known as distal biceps tendonitis. Distal refers to the elbow end of the biceps, because it is farther away from center of the body.   Causes of biceps tendonitis  The biceps tendons can be damaged by:    Lifting too heavy a weight    Using your arms more than usual, such as training harder for a sport or doing a task that requires a lot of lifting    Low-level stress on the tendons over time, especially repetitive motions    Other injuries to the arm or elbow  Symptoms of biceps tendonitis  These may include:    Pain or tenderness at the front of the elbow    Pain that gets worse when bending the elbow or rotating the forearm    Arm weakness    A crackling sound or grating feeling when moving the elbow  Treatment for biceps tendonitis  This problem often gets better with rest and medicines. Treatments aim to reduce pain and help the tendon heal. Possible treatments include:     Avoiding actions that make the pain worse to allow the elbow to rest    Using over-the-counter or prescription medicine to help relieve pain and swelling. NSAIDs (nonsteroidal anti-inflammatory drugs) are the most common medicines used. Medicines may be prescribed or bought over the counter. They may be given as pills. Or they may be put on the skin as a gel, cream, or patch.    Applying cold packs to help relieve pain and swelling    Trying stretches and other exercises to help with range of motion and strength  If these treatments don t do enough to relieve symptoms, physical therapy may be helpful. For severe symptoms, or if the tendon ruptures, your healthcare provider may advise surgery to repair the tendon.      When to call your healthcare provider  Call your healthcare provider right away if you have any of these:    Fever of 100.4 F (38 C) or higher, , or as directed by your provider    Chills    Symptoms that don t get better, or get worse    New symptoms    Bruising or swelling of the injured arm    Bulging appearance to the biceps muscle on the injured arm  Jarrod last reviewed this educational content on 6/1/2019 2000-2020 [employer]. All rights reserved. This information is not intended as a substitute for professional medical care. Always follow your healthcare professional's instructions.

## 2021-04-07 NOTE — PROGRESS NOTES
Assessment & Plan     Right elbow pain  Likely biceps tendinosis.  Right elbow x-ray, independently reviewed by me, unremarkable x-ray for any fractures including avulsion fractures.  Start rest, ice, compression elevation.  Advised to obtain over-the-counter elbow sleeve for support. OTC tylenol/ibuprofen PRN for pain.  Start outpatient physical therapy, referral to sports medicine if symptoms refractory.  - XR Elbow Right 2 Views  - [facility] PT AND HAND REFERRAL; Future    Upper respiratory tract infection, unspecified type  1 week of symptoms, check for COVID-19, start intranasal Flonase, consider antibiotics if COVID-19 negative and symptoms not better in 3 days.  - Symptomatic COVID-19 Virus (Coronavirus) by PCR  - fluticasone (FLONASE) 50 MCG/ACT nasal spray  Dispense: 16 g; Refill: 0    Tinea versicolor  Acute on chronic.  Start topical ketoconazole.   - ketoconazole (NIZORAL) 2 % external cream  Dispense: 60 g; Refill: 0      Return in about 3 days (around 4/10/2021) for If symptoms do not improve or gets worse..    Clive Tristan MD  [facility] CRISTIN Solorio is a 36 year old who presents for the following health issues     Musculoskeletal Problem  Associated symptoms include arthralgias and a rash. Pertinent negatives include no fever.      Acute Illness  Acute illness concerns: sinus pressure, headache  Onset/Duration: 1 week  Symptoms:  Fever: no  Chills/Sweats: no  Headache (location?): YES  Sinus Pressure: YES  Conjunctivitis:  no  Ear Pain: YES: bilateral  Rhinorrhea: YES  Congestion: YES  Sore Throat: YES  Cough: no  Wheeze: no  Decreased Appetite: no  Nausea: no  Vomiting: no  Diarrhea: no  Dysuria/Freq.: no  Dysuria or Hematuria: no  Fatigue/Achiness: YES  Sick/Strep Exposure: no  Therapies tried and outcome: tylenol cold and sinus    Musculoskeletal problem/pain  Onset/Duration: 2 months  Description  Location: arm/elbow - right  Joint Swelling: no  Redness: no  Pain:  "YES  Warmth: no  Intensity:  mild  Progression of Symptoms:  intermittent  Accompanying signs and symptoms:   Fevers: no  Numbness/tingling/weakness: YES-weakness  History  Trauma to the area: no  Recent illness:  no  Previous similar problem: no  Previous evaluation:  no  Precipitating or alleviating factors:  Aggravating factors include: restraining animals, walking dog, bending arm  Therapies tried and outcome: heat, ice, acetaminophen and Ibuprofen    Rash  Onset/Duration: annually- reappeared 2-3 months ago  Description  Location: red, blotchy  Character: round, blotchy, red  Itching: moderate  Intensity:  moderate  Progression of Symptoms:  worsening  Accompanying signs and symptoms:   Fever: no  Body aches or joint pain: no  Sore throat symptoms: no  Recent cold symptoms: no  History:           Previous episodes of similar rash: yes  New exposures:  None  Recent travel: no  Exposure to similar rash: no  Precipitating or alleviating factors:   Therapies tried and outcome: head and shoulders, ketoconazole cream in the past      Review of Systems   Constitutional: Negative for fever.   Musculoskeletal: Positive for arthralgias.   Skin: Positive for rash.            Objective    /70 (Cuff Size: Adult Large)   Pulse 100   Temp 98.2  F (36.8  C) (Oral)   Resp 12   Ht 1.765 m (5' 9.5\")   Wt 88.9 kg (196 lb)   BMI 28.53 kg/m    Body mass index is 28.53 kg/m .  Physical Exam  Vitals signs reviewed.   Cardiovascular:      Rate and Rhythm: Normal rate and regular rhythm.   Pulmonary:      Effort: Pulmonary effort is normal.      Breath sounds: Normal breath sounds.   Musculoskeletal:      Comments: Tenderness over the distal right biceps tendon insertion site    Right medial and lateral epicondyle is nontender.    Elbow, full range of motion, some mild tenderness posterior to the right olecranon process.       Neurological:      General: No focal deficit present.                        Answers for HPI/ROS " submitted by the patient on 4/7/2021   If you checked off any problems, how difficult have these problems made it for you to do your work, take care of things at home, or get along with other people?: Not difficult at all  PHQ9 TOTAL SCORE: 3

## 2021-04-08 LAB
LABORATORY COMMENT REPORT: NORMAL
SARS-COV-2 RNA RESP QL NAA+PROBE: NEGATIVE
SPECIMEN SOURCE: NORMAL

## 2021-04-08 ASSESSMENT — PATIENT HEALTH QUESTIONNAIRE - PHQ9: SUM OF ALL RESPONSES TO PHQ QUESTIONS 1-9: 3

## 2021-05-25 ENCOUNTER — RECORDS - HEALTHEAST (OUTPATIENT)
Dept: ADMINISTRATIVE | Facility: CLINIC | Age: 36
End: 2021-05-25

## 2021-06-03 DIAGNOSIS — J06.9 UPPER RESPIRATORY TRACT INFECTION, UNSPECIFIED TYPE: ICD-10-CM

## 2021-06-03 RX ORDER — FLUTICASONE PROPIONATE 50 MCG
1 SPRAY, SUSPENSION (ML) NASAL DAILY
Qty: 16 G | Refills: 0 | Status: SHIPPED | OUTPATIENT
Start: 2021-06-03 | End: 2021-08-31

## 2021-06-03 NOTE — TELEPHONE ENCOUNTER
Routing refill request to provider for review/approval because:  Drug interaction warning - ketoconazole and fluticasone    Kevin HIDALGO RN

## 2021-08-30 NOTE — PROGRESS NOTES
Pre-Visit Planning   Next 5 appointments (look out 90 days)    Aug 31, 2021 10:30 AM  (Arrive by 10:10 AM)  Office Visit with Em Morfin PA-C  [facility] ([facility] ) [address] [facility] [address] [phone]        Appointment Notes for this encounter:   cut on ankle looked at, tetanus if needed    Questionnaires Reviewed/Assigned  No additional questionnaires are needed       Patient preferred phone number: [phone]    Unable to reach. Left voicemail. Advised patient to call clinic back at [phone].

## 2021-08-31 ENCOUNTER — OFFICE VISIT (OUTPATIENT)
Dept: FAMILY MEDICINE | Facility: CLINIC | Age: 36
End: 2021-08-31
Payer: COMMERCIAL

## 2021-08-31 VITALS
TEMPERATURE: 98.5 F | SYSTOLIC BLOOD PRESSURE: 126 MMHG | HEART RATE: 68 BPM | RESPIRATION RATE: 16 BRPM | HEIGHT: 70 IN | WEIGHT: 203.5 LBS | DIASTOLIC BLOOD PRESSURE: 68 MMHG | BODY MASS INDEX: 29.13 KG/M2

## 2021-08-31 DIAGNOSIS — L03.115 CELLULITIS OF RIGHT LOWER EXTREMITY: Primary | ICD-10-CM

## 2021-08-31 PROCEDURE — 99213 OFFICE O/P EST LOW 20 MIN: CPT | Performed by: PHYSICIAN ASSISTANT

## 2021-08-31 RX ORDER — SULFAMETHOXAZOLE/TRIMETHOPRIM 800-160 MG
1 TABLET ORAL 2 TIMES DAILY
Qty: 20 TABLET | Refills: 0 | Status: SHIPPED | OUTPATIENT
Start: 2021-08-31 | End: 2021-09-10

## 2021-08-31 ASSESSMENT — MIFFLIN-ST. JEOR: SCORE: 1680.38

## 2021-08-31 NOTE — PROGRESS NOTES
"    Assessment & Plan     Cellulitis of right lower extremity  Discussed red flags to watch for.  Continue to keep clean and dry.  - sulfamethoxazole-trimethoprim (BACTRIM DS) 800-160 MG tablet; Take 1 tablet by mouth 2 times daily for 10 days             Tobacco Cessation:   reports that she has been smoking cigarettes. She has been smoking about 1.00 pack per day. She has never used smokeless tobacco.      BMI:   Estimated body mass index is 29.62 kg/m  as calculated from the following:    Height as of this encounter: 1.765 m (5' 9.5\").    Weight as of this encounter: 92.3 kg (203 lb 8 oz).           No follow-ups on file.    Em Morfin PA-C  [facility] is a 36 year old who presents for the following health issues     History of Present Illness       She eats 2-3 servings of fruits and vegetables daily.She consumes 2 sweetened beverage(s) daily.She exercises with enough effort to increase her heart rate 30 to 60 minutes per day.  She exercises with enough effort to increase her heart rate 7 days per week.   She is taking medications regularly.       Wound on right ankle: Cut by dog lead. Unsure if the plastic or the metal inside cut her ankle. Cut from last Wednesday. Patient has been using bacitracin and neosporin. Wound is red and inflamed. No streaks yet.     Additional complaints: None  HPI additional notes: Lyndsey presents today with   Chief Complaint   Patient presents with     WOUND CARE     right ankle            Review of Systems   C: Negative for fever, chills, recent change in weight  Skin: POSITIVE for warmth, swelling and redness of the right ankle. Negative for discharge and pruritis  ENT: Negative for ear, mouth and throat problems  MS: Negative for significant arthralgias or myalgias  P: Negative for changes in mood or affect      Objective    /68 (BP Location: Right arm, Patient Position: Sitting, Cuff Size: Adult Regular)   Pulse " "68   Temp 98.5  F (36.9  C) (Oral)   Resp 16   Ht 1.765 m (5' 9.5\")   Wt 92.3 kg (203 lb 8 oz)   BMI 29.62 kg/m    Body mass index is 29.62 kg/m .  Physical Exam     Physical Exam   GENERAL: healthy, alert, in no acute distress  SKIN: 4 mm x 5 cm laceration with granulation tissue in wound, swelling, erythema and warmth surrounding about 0.5 cm around laceration.    PSYCH: Alert and oriented times 3;  Able to articulate logical thoughts. Affect is normal.    Diagnostic test results: none    "

## 2021-09-19 ENCOUNTER — HEALTH MAINTENANCE LETTER (OUTPATIENT)
Age: 36
End: 2021-09-19

## 2022-01-09 ENCOUNTER — HEALTH MAINTENANCE LETTER (OUTPATIENT)
Age: 37
End: 2022-01-09

## 2022-02-17 ENCOUNTER — OFFICE VISIT (OUTPATIENT)
Dept: FAMILY MEDICINE | Facility: CLINIC | Age: 37
End: 2022-02-17
Payer: COMMERCIAL

## 2022-02-17 VITALS
BODY MASS INDEX: 31.19 KG/M2 | WEIGHT: 214.3 LBS | TEMPERATURE: 98.5 F | SYSTOLIC BLOOD PRESSURE: 120 MMHG | HEART RATE: 104 BPM | RESPIRATION RATE: 16 BRPM | OXYGEN SATURATION: 98 % | DIASTOLIC BLOOD PRESSURE: 68 MMHG

## 2022-02-17 DIAGNOSIS — M54.50 ACUTE BILATERAL LOW BACK PAIN WITHOUT SCIATICA: Primary | ICD-10-CM

## 2022-02-17 DIAGNOSIS — Z12.4 CERVICAL CANCER SCREENING: ICD-10-CM

## 2022-02-17 PROCEDURE — 99213 OFFICE O/P EST LOW 20 MIN: CPT | Performed by: FAMILY MEDICINE

## 2022-02-17 RX ORDER — CYCLOBENZAPRINE HCL 10 MG
10 TABLET ORAL 3 TIMES DAILY PRN
Qty: 30 TABLET | Refills: 1 | Status: SHIPPED | OUTPATIENT
Start: 2022-02-17 | End: 2022-05-26

## 2022-02-17 RX ORDER — BUPROPION HYDROCHLORIDE 100 MG/1
100 TABLET ORAL DAILY
COMMUNITY
Start: 2021-08-04 | End: 2023-08-16

## 2022-02-17 ASSESSMENT — ANXIETY QUESTIONNAIRES
IF YOU CHECKED OFF ANY PROBLEMS ON THIS QUESTIONNAIRE, HOW DIFFICULT HAVE THESE PROBLEMS MADE IT FOR YOU TO DO YOUR WORK, TAKE CARE OF THINGS AT HOME, OR GET ALONG WITH OTHER PEOPLE: SOMEWHAT DIFFICULT
1. FEELING NERVOUS, ANXIOUS, OR ON EDGE: SEVERAL DAYS
GAD7 TOTAL SCORE: 6
6. BECOMING EASILY ANNOYED OR IRRITABLE: SEVERAL DAYS
5. BEING SO RESTLESS THAT IT IS HARD TO SIT STILL: NOT AT ALL
2. NOT BEING ABLE TO STOP OR CONTROL WORRYING: SEVERAL DAYS
3. WORRYING TOO MUCH ABOUT DIFFERENT THINGS: SEVERAL DAYS
7. FEELING AFRAID AS IF SOMETHING AWFUL MIGHT HAPPEN: NOT AT ALL

## 2022-02-17 ASSESSMENT — ENCOUNTER SYMPTOMS
HEADACHES: 0
WEAKNESS: 0
BACK PAIN: 1
CONSTITUTIONAL NEGATIVE: 1
PARESTHESIAS: 0
NECK PAIN: 1

## 2022-02-17 ASSESSMENT — PATIENT HEALTH QUESTIONNAIRE - PHQ9
SUM OF ALL RESPONSES TO PHQ QUESTIONS 1-9: 0
5. POOR APPETITE OR OVEREATING: MORE THAN HALF THE DAYS

## 2022-02-17 ASSESSMENT — PAIN SCALES - GENERAL: PAINLEVEL: MODERATE PAIN (4)

## 2022-02-17 NOTE — PROGRESS NOTES
Assessment and Plan    (M54.50) Acute bilateral low back pain without sciatica  (primary encounter diagnosis)  Comment: flexeril for PRN, PT to work on core strentch and check use  Plan: cyclobenzaprine (FLEXERIL) 10 MG tablet,         Physical Therapy Referral            (Z12.4) Cervical cancer screening  Comment: will arrange for CPE  Plan:       RTC in     Jignesh Fine MD      Sheba Solorio is a 36 year old who presents for the following health issues     History of Present Illness       Back Pain:  She presents for follow up of back pain. Patient's back pain is a recurring problem.  Location of back pain:  Right lower back, left lower back, right middle of back, left middle of back, right upper back, left upper back, right side of neck, left side of neck, right shoulder and left shoulder  Description of back pain: cramping and dull ache  Back pain spreads: nowhere    Since patient first noticed back pain, pain is: unchanged  Does back pain interfere with her job:  Not applicable      She eats 0-1 servings of fruits and vegetables daily.She consumes 2 sweetened beverage(s) daily.She exercises with enough effort to increase her heart rate 30 to 60 minutes per day.  She exercises with enough effort to increase her heart rate 5 days per week.   She is taking medications regularly.     Currently working as , lifting large dogs, restraining animals, will have issues with back being tight, uncomfortable.  When neck involved can trigger headaches.  Uses ibuprofen and tylenol pretty regularly and is a bit worried about using them so much.  Can wake at night from pain.  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  Has seen chiro and massage      Review of Systems   Constitutional: Negative.    Musculoskeletal: Positive for back pain and neck pain.   Neurological: Negative for weakness, headaches and paresthesias.            Objective    /68 (BP  Location: Right arm, Patient Position: Sitting, Cuff Size: Adult Large)   Pulse 104   Temp 98.5  F (36.9  C) (Oral)   Resp 16   Wt 97.2 kg (214 lb 4.8 oz)   LMP 02/13/2022 (Approximate)   SpO2 98%   Breastfeeding No   BMI 31.19 kg/m    Body mass index is 31.19 kg/m .  Physical Exam  Vitals and nursing note reviewed.   Skin:     General: Skin is warm and dry.   Neurological:      General: No focal deficit present.      Mental Status: She is oriented to person, place, and time.   Psychiatric:         Mood and Affect: Mood normal.         Behavior: Behavior normal.

## 2022-02-18 ASSESSMENT — ANXIETY QUESTIONNAIRES: GAD7 TOTAL SCORE: 6

## 2022-03-02 ENCOUNTER — THERAPY VISIT (OUTPATIENT)
Dept: PHYSICAL THERAPY | Facility: CLINIC | Age: 37
End: 2022-03-02
Attending: FAMILY MEDICINE
Payer: COMMERCIAL

## 2022-03-02 DIAGNOSIS — M54.50 ACUTE BILATERAL LOW BACK PAIN WITHOUT SCIATICA: ICD-10-CM

## 2022-03-02 PROCEDURE — 97161 PT EVAL LOW COMPLEX 20 MIN: CPT | Mod: GP | Performed by: PHYSICAL THERAPIST

## 2022-03-02 PROCEDURE — 97110 THERAPEUTIC EXERCISES: CPT | Mod: GP | Performed by: PHYSICAL THERAPIST

## 2022-03-02 NOTE — PROGRESS NOTES
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 Pt referred by MD for physical therapy on 2-17-22      Patient Health History  Diana Roblero being seen for Recurring muscle pain in back and shoulders.     Problem began: 11/1/2021.   Problem occurred: Consistent lifting and restraint   Pain is reported as 3/10 on pain scale.  General health as reported by patient is fair.  Pertinent medical history includes: depression, history of fractures, mental illness, migraines/headaches, pain at night/rest and smoking.     Medical allergies: other. Other medical allergies details: Medications as noted.   Surgeries include:  None.    Current medications:  Anti-depressants, muscle relaxants, pain medication and other. Other medications details: Anxiety and migraine medications.    Current occupation is Veterinary medicine.   Primary job tasks include:  Computer work, lifting/carrying, prolonged standing, pushing/pulling and repetitive tasks.                  Therapist Generated HPI Evaluation         Type of problem:  Cervical spine and thoracic spine.    This is a chronic condition.  Condition occurred with:  Lifting.  Where condition occurred: in the community.  Patient reports pain:  Upper thoracic, mid thoracic, lower thoracic, cervical left side and cervical right side (upper lumbar ).  Pain is described as aching and cramping and is constant.  Pain is worse during the day.  Since onset symptoms are unchanged.  Associated symptoms:  Loss of motion/stiffness, loss of strength and headache. Symptoms are exacerbated by carrying, lifting and looking up or down (sleeping, standing)  and relieved by muscle relaxants and NSAID's.    Previous treatment includes chiropractic. There was significant improvement following previous treatment.  Work activity restrictions: working in normal job with  pain.  Barriers include:  None as reported by patient.                        Objective:  Standing Alignment:    Cervical/thoracic deviations alignment: forward head and shoulder posture, increased lumbar lordosis.                    Flexibility/Screens:         Spine:  Decreased left spine flexibility:  Rhomboids; Upper Trap and Levator    Decreased right spine flexibility:  Rhomboids; Upper Trap and Levator                  Cervical/Thoracic Evaluation    AROM:    AROM Thoracic:    Flexion:               Minimal loss   Extension:          Moderate loss   Rotation:            Left: moderate loss      Right: moderate loss     Strength: weak scapular stabilizers and lumbar extensors            Cervical Palpation:    Tenderness present at Left:    Rhomboids; Upper Trap; Levator and Erector Spinae  Tenderness present at Right:    Rhomboids; Upper Trap; Levator and Erector Spinae                                                  General     ROS    Assessment/Plan:    Patient is a 36 year old female with thoracic and lumbar complaints.    Patient has the following significant findings with corresponding treatment plan.                Diagnosis 1:  Thoracolumbar pain  Pain -  hot/cold therapy, manual therapy, self management, education and home program  Decreased ROM/flexibility - manual therapy, therapeutic exercise, therapeutic activity and home program  Decreased strength - therapeutic exercise, therapeutic activities and home program    Therapy Evaluation Codes:   1) History comprised of:   Personal factors that impact the plan of care:      None.    Comorbidity factors that impact the plan of care are:      Depression, Migraines/headaches, Pain at night/rest, Smoking, Weakness and history of fractures.     Medications impacting care: Anti-depressant, Muscle relaxant and Pain.  2) Examination of Body Systems comprised of:   Body structures and functions that impact the plan of care:      Lumbar spine and Thoracic  Spine.   Activity limitations that impact the plan of care are:      Lifting, Working, Sleeping and Laying down.  3) Clinical presentation characteristics are:   Stable/Uncomplicated.  4) Decision-Making    Low complexity using standardized patient assessment instrument and/or measureable assessment of functional outcome.  Cumulative Therapy Evaluation is: Low complexity.    Previous and current functional limitations:  (See Goal Flow Sheet for this information)    Short term and Long term goals: (See Goal Flow Sheet for this information)     Communication ability:  Patient appears to be able to clearly communicate and understand verbal and written communication and follow directions correctly.  Treatment Explanation - The following has been discussed with the patient:   RX ordered/plan of care  Anticipated outcomes  Possible risks and side effects  This patient would benefit from PT intervention to resume normal activities.   Rehab potential is good.    Frequency:  1 X week, once daily  Duration:  for 6 weeks  Discharge Plan:  Achieve all LTG.  Independent in home treatment program.  Reach maximal therapeutic benefit.    Please refer to the daily flowsheet for treatment today, total treatment time and time spent performing 1:1 timed codes.

## 2022-03-16 ENCOUNTER — THERAPY VISIT (OUTPATIENT)
Dept: PHYSICAL THERAPY | Facility: CLINIC | Age: 37
End: 2022-03-16
Payer: COMMERCIAL

## 2022-03-16 DIAGNOSIS — M54.50 ACUTE BILATERAL LOW BACK PAIN WITHOUT SCIATICA: ICD-10-CM

## 2022-03-16 PROCEDURE — 97110 THERAPEUTIC EXERCISES: CPT | Mod: GP | Performed by: PHYSICAL THERAPIST

## 2022-03-16 NOTE — PROGRESS NOTES
Subjective:  HPI  Physical Exam                    Objective:  System    Physical Exam    General     ROS    Assessment/Plan:    SUBJECTIVE  Subjective changes as noted by pt:  Pt reports significant decrease in pain     Current pain level: 0/10     Changes in function:  Pt notes increase ease with all ADL's at home and work     Adverse reaction to treatment or activity:  None    OBJECTIVE  Changes in objective findings:  MMT upper extremities WNL and pain free. Lumbar and thoracic mobility improved. Decreased muscle guarding rhomboid.         ASSESSMENT  Diana continues to require intervention to meet STG and LTG's: PT  Patient's symptoms are resolving.  Response to therapy has shown an improvement in  pain level, ROM  and strength  Progress made towards STG/LTG?  Yes,     PLAN   Pt will continue with HEP program. Pt discharged at this time.     PTA/ATC plan:  N/A    Please refer to the daily flowsheet for treatment today, total treatment time and time spent performing 1:1 timed codes.

## 2022-04-07 ENCOUNTER — OFFICE VISIT (OUTPATIENT)
Dept: FAMILY MEDICINE | Facility: CLINIC | Age: 37
End: 2022-04-07
Payer: COMMERCIAL

## 2022-04-07 VITALS
WEIGHT: 208 LBS | HEART RATE: 95 BPM | RESPIRATION RATE: 16 BRPM | TEMPERATURE: 98.5 F | SYSTOLIC BLOOD PRESSURE: 110 MMHG | DIASTOLIC BLOOD PRESSURE: 70 MMHG | BODY MASS INDEX: 30.28 KG/M2

## 2022-04-07 DIAGNOSIS — Z30.433 ENCOUNTER FOR REMOVAL AND REINSERTION OF INTRAUTERINE CONTRACEPTIVE DEVICE: Primary | ICD-10-CM

## 2022-04-07 DIAGNOSIS — Z12.4 CERVICAL CANCER SCREENING: ICD-10-CM

## 2022-04-07 PROCEDURE — 99207 PR DROP WITH A PROCEDURE: CPT | Performed by: FAMILY MEDICINE

## 2022-04-07 PROCEDURE — 87624 HPV HI-RISK TYP POOLED RSLT: CPT | Performed by: FAMILY MEDICINE

## 2022-04-07 PROCEDURE — G0145 SCR C/V CYTO,THINLAYER,RESCR: HCPCS | Performed by: FAMILY MEDICINE

## 2022-04-07 PROCEDURE — 58300 INSERT INTRAUTERINE DEVICE: CPT | Performed by: FAMILY MEDICINE

## 2022-04-07 PROCEDURE — 58301 REMOVE INTRAUTERINE DEVICE: CPT | Performed by: FAMILY MEDICINE

## 2022-04-07 ASSESSMENT — PAIN SCALES - GENERAL: PAINLEVEL: MILD PAIN (2)

## 2022-04-07 NOTE — PROGRESS NOTES
Assessment and Plan    (Z30.433) Encounter for removal and reinsertion of intrauterine contraceptive device  (primary encounter diagnosis)  Comment:   Plan: levonorgestrel (MIRENA) 20 MCG/24HR IUD 20 mcg,        levonorgestrel (MIRENA) 20 MCG/24HR IUD,         INSERTION INTRAUTERINE DEVICE, REMOVE         INTRAUTERINE DEVICE            (Z12.4) Cervical cancer screening  Comment:   Plan: Pap Screen with HPV - recommended age 30 - 65         years              RTC in 1y    Jignesh Fine MD      Sheba Solorio is a 37 year old who presents for the following health issues     IUD       Notes for the past year that she will be very emotional for about a week bfore and the week of her period.  Is using Escitalopram, Buspar and Wellbutrin for mood, prescribed by psych.  Has had Mirena for last 5 years.  Periods were lighter with IUD.  In the past with PIERRE would have a lot of trouble with migraines.            Review of Systems   Constitutional: Negative.    Genitourinary: Negative.             Objective    /70   Pulse 95   Temp 98.5  F (36.9  C) (Oral)   Resp 16   Wt 94.3 kg (208 lb)   BMI 30.28 kg/m    Body mass index is 30.28 kg/m .  Physical Exam  Vitals and nursing note reviewed. Exam conducted with a chaperone present.   Constitutional:       Appearance: Normal appearance.   Genitourinary:     General: Normal vulva.      Vagina: Normal.      Cervix: Normal.      Uterus: Normal.       Adnexa: Right adnexa normal and left adnexa normal.   Skin:     General: Skin is warm and dry.   Neurological:      General: No focal deficit present.      Mental Status: She is alert and oriented to person, place, and time.   Psychiatric:         Mood and Affect: Mood normal.         Behavior: Behavior normal.                        SUBJECTIVE:    Today's PHQ-2 Score:   PHQ-2 ( 1999 Pfizer) 2/17/2022   Q1: Little interest or pleasure in doing things 0   Q2: Feeling down, depressed or hopeless 0   PHQ-2 Score 0   PHQ-2  Total Score (12-17 Years)- Positive if 3 or more points; Administer PHQ-A if positive -   Q1: Little interest or pleasure in doing things Not at all   Q2: Feeling down, depressed or hopeless Not at all   PHQ-2 Score 0       PROCEDURE:    Premedicated with ibuprofen.  A speculum exam was performed and the cervix was visualized. The IUD string was visualized. Using ring forceps, the string  was grasped and the IUD removed intact.    Under sterile technique, cervix was visualized with speculum and prepped with Betadine solution swab x 3.  The uterus was gently straightened and sounded to 7.0 cm. IUD prepared for placement, and IUD inserted according to 's instructions without difficulty or significant ressitance, and deployed at the fundus. The strings were visualized and trimmed to 7.0 cm from the external os. Tenaculum was removed and hemostasis noted. Speculum removed.  Patient tolerated procedure well.      EBL: minimal    Complications: none      POST PROCEDURE:    Given 's handouts, including when to have IUD removed, list of danger s/sx, side effects and follow up recommended. Encouraged condom use for prevention of STD. Advised to call for any fever, for prolonged or severe pain or bleeding, abnormal vaginal dischage, or unable to palpate strings. She was advised to use pain medications (ibuprofen) as needed for mild to moderate pain. Advised to follow-up in clinic in 4-6 weeks for IUD string check if unable to find strings or as directed by provider.     Jignesh Fine MD

## 2022-04-08 ASSESSMENT — ENCOUNTER SYMPTOMS: CONSTITUTIONAL NEGATIVE: 1

## 2022-04-11 LAB
BKR LAB AP GYN ADEQUACY: NORMAL
BKR LAB AP GYN INTERPRETATION: NORMAL
BKR LAB AP HPV REFLEX: NORMAL
BKR LAB AP PREVIOUS ABNORMAL: NORMAL
PATH REPORT.COMMENTS IMP SPEC: NORMAL
PATH REPORT.COMMENTS IMP SPEC: NORMAL
PATH REPORT.RELEVANT HX SPEC: NORMAL

## 2022-04-14 LAB
HUMAN PAPILLOMA VIRUS 16 DNA: NEGATIVE
HUMAN PAPILLOMA VIRUS 18 DNA: NEGATIVE
HUMAN PAPILLOMA VIRUS FINAL DIAGNOSIS: NORMAL
HUMAN PAPILLOMA VIRUS OTHER HR: NEGATIVE

## 2022-04-15 ENCOUNTER — MYC MEDICAL ADVICE (OUTPATIENT)
Dept: FAMILY MEDICINE | Facility: CLINIC | Age: 37
End: 2022-04-15
Payer: COMMERCIAL

## 2022-05-24 DIAGNOSIS — M54.50 ACUTE BILATERAL LOW BACK PAIN WITHOUT SCIATICA: ICD-10-CM

## 2022-05-26 RX ORDER — CYCLOBENZAPRINE HCL 10 MG
TABLET ORAL
Qty: 30 TABLET | Refills: 1 | Status: SHIPPED | OUTPATIENT
Start: 2022-05-26 | End: 2022-08-09

## 2022-08-02 ENCOUNTER — E-VISIT (OUTPATIENT)
Dept: FAMILY MEDICINE | Facility: CLINIC | Age: 37
End: 2022-08-02
Payer: COMMERCIAL

## 2022-08-02 DIAGNOSIS — J06.9 VIRAL URI: Primary | ICD-10-CM

## 2022-08-02 PROCEDURE — 99421 OL DIG E/M SVC 5-10 MIN: CPT | Performed by: PHYSICIAN ASSISTANT

## 2022-08-03 NOTE — PATIENT INSTRUCTIONS
The symptoms you describe suggest a viral cause, which is much more common than a bacterial cause. Antibiotics will treat bacterial infections, but have no effect on viral infections. If possible, especially if improving, start with symptom care for the first 7-10 days, then consider seeking further treatment or taking an antibiotic. Bacterial infections generally are more severe, including symptoms such as pus, fever over 101degrees F, or rapidly worsening.    Viral Upper Respiratory Illness (Adult)    You have a viral upper respiratory illness (URI), which is another term for the common cold. This illness is contagious during the first few days. It is spread through the air by coughing and sneezing. It may also be spread by direct contact (touching the sick person and then touching your own eyes, nose, or mouth). Frequent handwashing will decrease risk of spread. Most viral illnesses go away within 7 to 10 days with rest and simple home remedies. Sometimes the illness may last for several weeks. Antibiotics will not kill a virus, and they are generally not prescribed for this condition.  Home care    If symptoms are severe, rest at home for the first 2 to 3 days. When you resume activity, don't let yourself get too tired.    Don't smoke. If you need help stopping, talk with your healthcare provider.    Avoid being exposed to cigarette smoke (yours or others ).    You may use acetaminophen or ibuprofen to control pain and fever, unless another medicine was prescribed. If you have chronic liver or kidney disease, have ever had a stomach ulcer or gastrointestinal bleeding, or are taking blood-thinning medicines, talk with your healthcare provider before using these medicines. Aspirin should never be given to anyone under 18 years of age who is ill with a viral infection or fever. It may cause severe liver or brain damage.    Your appetite may be poor, so a light diet is fine. Stay well hydrated by drinking 6 to 8  glasses of fluids per day (water, soft drinks, juices, tea, or soup). Extra fluids will help loosen secretions in the nose and lungs.    Over-the-counter cold medicines will not shorten the length of time you re sick, but they may be helpful for the following symptoms: cough, sore throat, and nasal and sinus congestion. If you take prescription medicines, ask your healthcare provider or pharmacist which over-the-counter medicines are safe to use. (Note: Don't use decongestants if you have high blood pressure.)  Follow-up care  Follow up with your healthcare provider, or as advised.  When to seek medical advice  Call your healthcare provider right away if any of these occur:    Cough with lots of colored sputum (mucus)    Severe headache; face, neck, or ear pain    Difficulty swallowing due to throat pain    Fever of 100.4 F (38 C) or higher, or as directed by your healthcare provider  Call 911  Call 911 if any of these occur:    Chest pain, shortness of breath, wheezing, or difficulty breathing    Coughing up blood    Very severe pain with swallowing, especially if it goes along with a muffled voice   [employer] last reviewed this educational content on 6/1/2018 2000-2021 [employer]. All rights reserved. This information is not intended as a substitute for professional medical care. Always follow your healthcare professional's instructions.        Dear Lyndsey,      Based on your responses, you may have coronavirus (COVID-19).     Will I be tested for COVID-19?  We would like to test you for COVID. I have placed orders for this test.     To schedule: go to your [facility] home page and scroll down to the section that says  You have an appointment that needs to be scheduled  and click the large green button that says  Schedule Now  and follow the steps to find the next available openings.    If you are unable to complete these [facility] scheduling steps, please call [phone] to schedule your testing.      These guidelines are for isolating before returning to work, school or .     For employers, schools and day cares: This is an official notice for this person s medical guidelines for returning in-person.     For health care sites: The CDC gives different isolation and quarantine guidelines for healthcare sites, please check with these sites before arriving.     How do I self-isolate?  You isolate when you have symptoms of COVID or a test shows you have COVID, even if you don t have symptoms.     If you DO have symptoms:  o Stay home and away from others  - For at least 5 days after your symptoms started, AND   - You are fever free for 24 hours (with no medicine that reduces fever), AND  - Your other symptoms are better.  o Wear a mask for 10 full days any time you are around others.    If you DON T have symptoms:  o Stay at home and away from others for at least 5 days after your positive test.  o Wear a mask for 10 full days any time you are around others.    How can I take care of myself?  Over the counter medications may help with your symptoms such as runny or stuffy nose, cough, chills, or fever.  Talk to your care team about your options.     Some people are at high risk of severe illness (for example, you have a weak immune system, you re 65 years or older, or you have certain medical problems). If your risk is high and your symptoms started in the last 5 to 7 days, we strongly recommend for you to get COVID treatment as soon as possible. Paxlovid, Molnupiravir and the monoclonal antibody treatments are proven safe and effective, make you feel better faster, and prevent hospitalization and death.       To schedule an appointment to discuss COVID treatment, request an appointment on [employer] (select  COVID-19 Treatment ) or call [employer] [facility] ([phone]), press 7.      Get lots of rest. Drink extra fluids (unless a doctor has told you not to)    Take Tylenol (acetaminophen) or ibuprofen for fever  or pain. If you have liver or kidney problems, ask your family doctor if it's okay to take Tylenol or ibuprofen    Take over the counter medications for your symptoms, as directed by your doctor. You may also talk to your pharmacist.      If you have other health problems (like cancer, heart failure, an organ transplant or severe kidney disease): Call your specialty clinic if you don't feel better in the next 2 days.    Know when to call 911. Emergency warning signs include:  o Trouble breathing or shortness of breath  o Pain or pressure in the chest that doesn't go away  o Feeling confused like you haven't felt before, or not being able to wake up  o Bluish-colored lips or face    Where can I get more information?    [facility] - About COVID-19: [email or web address]     CDC - What to Do If You're Sick: https://www.cdc.gov/coronavirus/2019-ncov/if-you-are-sick/index.html     CDC - Quarantine & Isolation: https://www.cdc.gov/coronavirus/2019-ncov/your-health/quarantine-isolation.html     [facility] clinical trials (COVID-19 research studies): [email or web address]    Below are the COVID-19 hotlines at the [facility] ([facility]). Interpreters are available.  o For health questions: Call 127-441-0034 or 1-577.929.7648 (7 a.m. to 7 p.m.)  o For questions about schools and childcare: Call 353-311-6997 or 1-974.300.2512 (7 a.m. to 7 p.m.)

## 2022-08-06 ENCOUNTER — E-VISIT (OUTPATIENT)
Dept: URGENT CARE | Facility: URGENT CARE | Age: 37
End: 2022-08-06
Payer: COMMERCIAL

## 2022-08-06 DIAGNOSIS — L24.9 IRRITANT CONTACT DERMATITIS, UNSPECIFIED TRIGGER: Primary | ICD-10-CM

## 2022-08-06 DIAGNOSIS — M54.50 ACUTE BILATERAL LOW BACK PAIN WITHOUT SCIATICA: ICD-10-CM

## 2022-08-06 PROCEDURE — 99421 OL DIG E/M SVC 5-10 MIN: CPT | Performed by: PHYSICIAN ASSISTANT

## 2022-08-06 RX ORDER — TRIAMCINOLONE ACETONIDE 1 MG/G
OINTMENT TOPICAL 2 TIMES DAILY
Qty: 30 G | Refills: 1 | Status: SHIPPED | OUTPATIENT
Start: 2022-08-06 | End: 2023-08-16

## 2022-08-06 NOTE — TELEPHONE ENCOUNTER
Provider E-Visit time total (minutes): 4   Show Topical Anesthesia Variable?: Yes Medical Necessity Information: It is in your best interest to select a reason for this procedure from the list below. All of these items fulfill various CMS LCD requirements except the new and changing color options. Number Of Freeze-Thaw Cycles: 1 freeze-thaw cycle Consent: The patient's consent was obtained including but not limited to risks of crusting, scabbing, blistering, scarring, darker or lighter pigmentary change, recurrence, incomplete removal and infection. Render Note In Bullet Format When Appropriate: No Duration Of Freeze Thaw-Cycle (Seconds): 15-20 Post-Care Instructions: I reviewed with the patient in detail post-care instructions. Patient knows to avoid picking at any of the treated lesions. Pt may apply Vaseline and bandage to crusted or scabbing areas if needed for comfort. Medical Necessity Clause: This procedure was medically necessary because the lesions that were treated were: Detail Level: Detailed

## 2022-08-09 RX ORDER — CYCLOBENZAPRINE HCL 10 MG
TABLET ORAL
Qty: 30 TABLET | Refills: 1 | Status: SHIPPED | OUTPATIENT
Start: 2022-08-09 | End: 2022-10-13

## 2022-09-28 ENCOUNTER — E-VISIT (OUTPATIENT)
Dept: FAMILY MEDICINE | Facility: CLINIC | Age: 37
End: 2022-09-28
Payer: COMMERCIAL

## 2022-09-28 DIAGNOSIS — B36.0 TINEA VERSICOLOR: Primary | ICD-10-CM

## 2022-09-28 PROCEDURE — 99421 OL DIG E/M SVC 5-10 MIN: CPT | Performed by: FAMILY MEDICINE

## 2022-09-29 RX ORDER — KETOCONAZOLE 20 MG/G
CREAM TOPICAL DAILY
Qty: 30 G | Refills: 0 | Status: SHIPPED | OUTPATIENT
Start: 2022-09-29 | End: 2023-08-16

## 2022-11-21 ENCOUNTER — HEALTH MAINTENANCE LETTER (OUTPATIENT)
Age: 37
End: 2022-11-21

## 2022-12-26 NOTE — PROGRESS NOTES
Subjective:Subjective changes as noted by pt:  Pt reports significant decrease in pain     Current pain level: 0/10     Changes in function:  Pt notes increase ease with all ADL's at home and work     Adverse reaction to treatment or activity:  None     OBJECTIVE  Changes in objective findings:  MMT upper extremities WNL and pain free. Lumbar and thoracic mobility improved. Decreased muscle guarding rhomboid.   HPI  Physical Exam                    Objective:  System    Physical Exam    General     ROS    Assessment/Plan:    ASSESSMENT/PLAN  Updated problem list and treatment plan: Diagnosis 1:  Thoracolumbar pain  Pain -  hot/cold therapy, self management, education and home program  Decreased ROM/flexibility - therapeutic exercise, therapeutic activity and home program  Decreased strength - therapeutic exercise, therapeutic activities and home program  Progress toward STG/LTGs have been made:  Yes,   Assessment of Progress: The patient's condition is improving.  Self Management Plans:  Patient has been instructed in a home treatment program.  I have re-evaluated this patient and find that the nature, scope, duration and intensity of the therapy is appropriate for the medical condition of the patient.  Diana continues to require the following intervention to meet STG and LT's:  PT intervention is no longer required to meet STG/LTG.    Recommendations:  Pt has not returned for treatment since 3-16-22. Pt discharged at this time.      Please refer to the daily flowsheet for treatment today, total treatment time and time spent performing 1:1 timed codes.

## 2022-12-30 DIAGNOSIS — M54.50 ACUTE BILATERAL LOW BACK PAIN WITHOUT SCIATICA: ICD-10-CM

## 2023-01-02 RX ORDER — CYCLOBENZAPRINE HCL 10 MG
TABLET ORAL
Qty: 90 TABLET | Refills: 0 | Status: SHIPPED | OUTPATIENT
Start: 2023-01-02 | End: 2023-04-04

## 2023-01-02 NOTE — TELEPHONE ENCOUNTER
Routing refill request to provider for review/approval because:  Drug not on the [facility] refill protocol   Anya Mascorro RN

## 2023-04-01 DIAGNOSIS — M54.50 ACUTE BILATERAL LOW BACK PAIN WITHOUT SCIATICA: ICD-10-CM

## 2023-04-04 RX ORDER — CYCLOBENZAPRINE HCL 10 MG
TABLET ORAL
Qty: 90 TABLET | Refills: 0 | Status: SHIPPED | OUTPATIENT
Start: 2023-04-04 | End: 2023-06-26

## 2023-04-16 ENCOUNTER — HEALTH MAINTENANCE LETTER (OUTPATIENT)
Age: 38
End: 2023-04-16

## 2023-04-19 ENCOUNTER — OFFICE VISIT (OUTPATIENT)
Dept: FAMILY MEDICINE | Facility: CLINIC | Age: 38
End: 2023-04-19
Payer: COMMERCIAL

## 2023-04-19 VITALS
WEIGHT: 229 LBS | RESPIRATION RATE: 14 BRPM | HEIGHT: 70 IN | BODY MASS INDEX: 32.78 KG/M2 | SYSTOLIC BLOOD PRESSURE: 121 MMHG | DIASTOLIC BLOOD PRESSURE: 82 MMHG | HEART RATE: 102 BPM | OXYGEN SATURATION: 100 % | TEMPERATURE: 97.8 F

## 2023-04-19 DIAGNOSIS — F33.1 MAJOR DEPRESSIVE DISORDER, RECURRENT EPISODE, MODERATE (H): ICD-10-CM

## 2023-04-19 DIAGNOSIS — R21 RASH AND NONSPECIFIC SKIN ERUPTION: Primary | ICD-10-CM

## 2023-04-19 PROCEDURE — 99213 OFFICE O/P EST LOW 20 MIN: CPT | Performed by: PHYSICIAN ASSISTANT

## 2023-04-19 RX ORDER — CLOTRIMAZOLE AND BETAMETHASONE DIPROPIONATE 10; .64 MG/G; MG/G
CREAM TOPICAL 2 TIMES DAILY
Qty: 45 G | Refills: 0 | Status: SHIPPED | OUTPATIENT
Start: 2023-04-19 | End: 2023-08-16

## 2023-04-19 ASSESSMENT — PATIENT HEALTH QUESTIONNAIRE - PHQ9
SUM OF ALL RESPONSES TO PHQ QUESTIONS 1-9: 5
SUM OF ALL RESPONSES TO PHQ QUESTIONS 1-9: 5
10. IF YOU CHECKED OFF ANY PROBLEMS, HOW DIFFICULT HAVE THESE PROBLEMS MADE IT FOR YOU TO DO YOUR WORK, TAKE CARE OF THINGS AT HOME, OR GET ALONG WITH OTHER PEOPLE: SOMEWHAT DIFFICULT

## 2023-04-19 NOTE — PROGRESS NOTES
"      Assessment & Plan     Rash and nonspecific skin eruption  She has been dealing with this for over 6 months.  Advised to try Lotrisone and follow-up with derm.  Question whether its medication related.  As wellbutrin, buspar and lexapro all cause skin rash non-specific   - clotrimazole-betamethasone (LOTRISONE) 1-0.05 % external cream; Apply topically 2 times daily  - Adult Dermatology Referral; Future    [id number]}     Nicotine/Tobacco Cessation:  She reports that she has been smoking cigarettes. She has been smoking an average of 1 pack per day. She has never used smokeless tobacco.  Nicotine/Tobacco Cessation Plan:         BMI:   Estimated body mass index is 33.33 kg/m  as calculated from the following:    Height as of this encounter: 1.765 m (5' 9.5\").    Weight as of this encounter: 103.9 kg (229 lb).           Ramona Ann Aaseby-Aguilera, PA-C  [facility] CRISTIN Solorio is a 38 year old, presenting for the following health issues:  Derm Problem      Started getting welt on upper arms only 6 months ago and has used triamcinolone and welts go away but come back.  Very itchy.  Start out small and then get to be the size in pic about 3 cm. Works at a [employer].        View : No data to display.              History of Present Illness       Reason for visit:  Recurring itchy welt or hive like marks on arms that take several weeks to go away with triamcinolone ointment.    She eats 0-1 servings of fruits and vegetables daily.She consumes 3 sweetened beverage(s) daily.She exercises with enough effort to increase her heart rate 60 or more minutes per day.  She exercises with enough effort to increase her heart rate 7 days per week.   She is taking medications regularly.    Today's PHQ-9         PHQ-9 Total Score: 5    PHQ-9 Q9 Thoughts of better off dead/self-harm past 2 weeks :   Not at all    How difficult have these problems made it for you to do your work, take care of things at " "home, or get along with other people: Somewhat difficult             Review of Systems   Constitutional, HEENT, cardiovascular, pulmonary, gi and gu systems are negative, except as otherwise noted.      Objective    /82 (BP Location: Right arm, Patient Position: Sitting, Cuff Size: Adult Large)   Pulse 102   Temp 97.8  F (36.6  C) (Oral)   Resp 14   Ht 1.765 m (5' 9.5\")   Wt 103.9 kg (229 lb)   SpO2 100%   Breastfeeding No   BMI 33.33 kg/m    Body mass index is 33.33 kg/m .  Physical Exam   GENERAL: healthy, alert and no distress  SKIN: patches or erythema with te largest 3 cm  See pic.                 "

## 2023-05-01 ENCOUNTER — E-VISIT (OUTPATIENT)
Dept: FAMILY MEDICINE | Facility: CLINIC | Age: 38
End: 2023-05-01
Payer: COMMERCIAL

## 2023-05-01 DIAGNOSIS — R21 RASH AND NONSPECIFIC SKIN ERUPTION: Primary | ICD-10-CM

## 2023-05-01 PROCEDURE — 99421 OL DIG E/M SVC 5-10 MIN: CPT | Performed by: PHYSICIAN ASSISTANT

## 2023-05-02 RX ORDER — PREDNISONE 20 MG/1
60 TABLET ORAL DAILY
Qty: 15 TABLET | Refills: 0 | Status: SHIPPED | OUTPATIENT
Start: 2023-05-02 | End: 2023-05-07

## 2023-06-22 ENCOUNTER — TRANSFERRED RECORDS (OUTPATIENT)
Dept: HEALTH INFORMATION MANAGEMENT | Facility: CLINIC | Age: 38
End: 2023-06-22
Payer: COMMERCIAL

## 2023-06-25 DIAGNOSIS — M54.50 ACUTE BILATERAL LOW BACK PAIN WITHOUT SCIATICA: ICD-10-CM

## 2023-06-26 RX ORDER — CYCLOBENZAPRINE HCL 10 MG
TABLET ORAL
Qty: 90 TABLET | Refills: 0 | Status: SHIPPED | OUTPATIENT
Start: 2023-06-26 | End: 2023-08-29

## 2023-07-19 ENCOUNTER — LAB (OUTPATIENT)
Dept: LAB | Facility: CLINIC | Age: 38
End: 2023-07-19
Payer: COMMERCIAL

## 2023-07-19 DIAGNOSIS — L93.0 LUPUS ERYTHEMATOSUS: Primary | ICD-10-CM

## 2023-07-19 PROCEDURE — 86038 ANTINUCLEAR ANTIBODIES: CPT

## 2023-07-19 PROCEDURE — 82955 ASSAY OF G6PD ENZYME: CPT | Mod: 90

## 2023-07-19 PROCEDURE — 36415 COLL VENOUS BLD VENIPUNCTURE: CPT

## 2023-07-19 PROCEDURE — 80076 HEPATIC FUNCTION PANEL: CPT

## 2023-07-19 PROCEDURE — 85025 COMPLETE CBC W/AUTO DIFF WBC: CPT

## 2023-07-19 PROCEDURE — 99000 SPECIMEN HANDLING OFFICE-LAB: CPT

## 2023-07-20 ENCOUNTER — TELEPHONE (OUTPATIENT)
Dept: FAMILY MEDICINE | Facility: CLINIC | Age: 38
End: 2023-07-20
Payer: COMMERCIAL

## 2023-07-20 LAB
ALBUMIN SERPL BCG-MCNC: 4.1 G/DL (ref 3.5–5.2)
ALP SERPL-CCNC: 69 U/L (ref 35–104)
ALT SERPL W P-5'-P-CCNC: 18 U/L (ref 0–50)
AST SERPL W P-5'-P-CCNC: 28 U/L (ref 0–45)
BASOPHILS # BLD AUTO: 0.1 10E3/UL (ref 0–0.2)
BASOPHILS NFR BLD AUTO: 1 %
BILIRUB DIRECT SERPL-MCNC: <0.2 MG/DL (ref 0–0.3)
BILIRUB SERPL-MCNC: 0.2 MG/DL
EOSINOPHIL # BLD AUTO: 0.3 10E3/UL (ref 0–0.7)
EOSINOPHIL NFR BLD AUTO: 3 %
ERYTHROCYTE [DISTWIDTH] IN BLOOD BY AUTOMATED COUNT: 12.4 % (ref 10–15)
HCT VFR BLD AUTO: 45.3 % (ref 35–47)
HGB BLD-MCNC: 15.1 G/DL (ref 11.7–15.7)
IMM GRANULOCYTES # BLD: 0 10E3/UL
IMM GRANULOCYTES NFR BLD: 0 %
LYMPHOCYTES # BLD AUTO: 3.9 10E3/UL (ref 0.8–5.3)
LYMPHOCYTES NFR BLD AUTO: 39 %
MCH RBC QN AUTO: 31.9 PG (ref 26.5–33)
MCHC RBC AUTO-ENTMCNC: 33.3 G/DL (ref 31.5–36.5)
MCV RBC AUTO: 96 FL (ref 78–100)
MONOCYTES # BLD AUTO: 0.7 10E3/UL (ref 0–1.3)
MONOCYTES NFR BLD AUTO: 7 %
NEUTROPHILS # BLD AUTO: 4.9 10E3/UL (ref 1.6–8.3)
NEUTROPHILS NFR BLD AUTO: 50 %
NRBC # BLD AUTO: 0 10E3/UL
NRBC BLD AUTO-RTO: 0 /100
PLATELET # BLD AUTO: 294 10E3/UL (ref 150–450)
PROT SERPL-MCNC: 7 G/DL (ref 6.4–8.3)
RBC # BLD AUTO: 4.73 10E6/UL (ref 3.8–5.2)
WBC # BLD AUTO: 10 10E3/UL (ref 4–11)

## 2023-07-20 NOTE — TELEPHONE ENCOUNTER
Patient calls if results are back from yesterday 7/19/23.     Advised patient results are still in process     Patient advised ordering provider would get results and writer a result note that should post via [employer] or she should get a phone call.     Patient denied ?'s and agreeable to plan.     Arabella RASHEED RN   [facility] Triage

## 2023-07-21 LAB — ANA SER QL IF: NEGATIVE

## 2023-07-22 LAB — G6PD RBC-CCNT: 15.4 U/G HB

## 2023-08-16 ENCOUNTER — OFFICE VISIT (OUTPATIENT)
Dept: FAMILY MEDICINE | Facility: CLINIC | Age: 38
End: 2023-08-16
Payer: COMMERCIAL

## 2023-08-16 VITALS
TEMPERATURE: 98.2 F | DIASTOLIC BLOOD PRESSURE: 82 MMHG | OXYGEN SATURATION: 93 % | WEIGHT: 243.5 LBS | BODY MASS INDEX: 34.86 KG/M2 | RESPIRATION RATE: 18 BRPM | HEART RATE: 93 BPM | SYSTOLIC BLOOD PRESSURE: 130 MMHG | HEIGHT: 70 IN

## 2023-08-16 DIAGNOSIS — R60.0 PERIPHERAL EDEMA: ICD-10-CM

## 2023-08-16 DIAGNOSIS — E66.812 CLASS 2 SEVERE OBESITY DUE TO EXCESS CALORIES WITH SERIOUS COMORBIDITY AND BODY MASS INDEX (BMI) OF 35.0 TO 35.9 IN ADULT (H): Primary | ICD-10-CM

## 2023-08-16 DIAGNOSIS — E66.01 CLASS 2 SEVERE OBESITY DUE TO EXCESS CALORIES WITH SERIOUS COMORBIDITY AND BODY MASS INDEX (BMI) OF 35.0 TO 35.9 IN ADULT (H): Primary | ICD-10-CM

## 2023-08-16 PROBLEM — L93.0 LUPUS ERYTHEMATOSUS: Status: ACTIVE | Noted: 2023-07-01

## 2023-08-16 LAB — HBA1C MFR BLD: 5.6 % (ref 0–5.6)

## 2023-08-16 PROCEDURE — 36415 COLL VENOUS BLD VENIPUNCTURE: CPT | Performed by: NURSE PRACTITIONER

## 2023-08-16 PROCEDURE — 80048 BASIC METABOLIC PNL TOTAL CA: CPT | Performed by: NURSE PRACTITIONER

## 2023-08-16 PROCEDURE — 84443 ASSAY THYROID STIM HORMONE: CPT | Performed by: NURSE PRACTITIONER

## 2023-08-16 PROCEDURE — 83036 HEMOGLOBIN GLYCOSYLATED A1C: CPT | Performed by: NURSE PRACTITIONER

## 2023-08-16 PROCEDURE — 99214 OFFICE O/P EST MOD 30 MIN: CPT | Performed by: NURSE PRACTITIONER

## 2023-08-16 RX ORDER — BETAMETHASONE DIPROPIONATE 0.5 MG/G
CREAM TOPICAL
COMMUNITY
Start: 2023-07-07

## 2023-08-16 RX ORDER — DAPSONE 100 MG/1
TABLET ORAL
COMMUNITY
Start: 2023-08-06 | End: 2024-01-04

## 2023-08-16 RX ORDER — FUROSEMIDE 20 MG
20 TABLET ORAL DAILY PRN
Qty: 30 TABLET | Refills: 3 | Status: SHIPPED | OUTPATIENT
Start: 2023-08-16

## 2023-08-16 RX ORDER — BUPROPION HYDROCHLORIDE 100 MG/1
100 TABLET, EXTENDED RELEASE ORAL EVERY MORNING
COMMUNITY
Start: 2023-07-08

## 2023-08-16 ASSESSMENT — PAIN SCALES - GENERAL: PAINLEVEL: NO PAIN (0)

## 2023-08-16 NOTE — PROGRESS NOTES
"  Assessment & Plan     Class 2 severe obesity due to excess calories with serious comorbidity and body mass index (BMI) of 35.0 to 35.9 in adult (H)  - check labs and will attempt to cover wegovy, no family history of medullary thyroid caner and or pancreatitis.   - Semaglutide-Weight Management (WEGOVY) 0.25 MG/0.5ML pen  Dispense: 2 mL; Refill: 0  - Basic metabolic panel  (Ca, Cl, CO2, Creat, Gluc, K, Na, BUN)  - TSH with free T4 reflex  - Hemoglobin A1c  - will screen with labs and treat as indicated  - education on weight loss choice if wegovy not covered  ? Need to discuss weight loss surgery    Peripheral edema  - discussed venous insufficiency and how to treat with monitoring salt and compression stocking, will use lasix as needed    - furosemide (LASIX) 20 MG tablet  Dispense: 30 tablet; Refill: 3     BMI:   Estimated body mass index is 35.44 kg/m  as calculated from the following:    Height as of this encounter: 1.765 m (5' 9.5\").    Weight as of this encounter: 110.5 kg (243 lb 8 oz).   Weight management plan: Discussed healthy diet and exercise guidelines        Alejandrina Dai NP  [facility] a 38 year [facility], presenting for the following health issues:  Foot Swelling      8/16/2023     3:44 PM   Additional Questions   Roomed by Jennifer COLEMAN       History of Present Illness       Reason for visit:  Weight gain, excessive sweating, random numbness in thighs, swelling in left foot and ankle  Symptom onset:  More than a month  Symptom intensity:  Moderate  Symptom progression:  Worsening  Had these symptoms before:  No    She eats 0-1 servings of fruits and vegetables daily.She consumes 2 sweetened beverage(s) daily.She exercises with enough effort to increase her heart rate 60 or more minutes per day.  She exercises with enough effort to increase her heart rate 6 days per week.   She is taking medications regularly.     She states she is having some swelling " "in the left foot and ankle and she states it happens at work and she is on her feet a lot  at  work. She denies injury and she will have an indent in her medial ankle from when she pushes, she has tried compression stocking and she when she takes them her ankle will swell right away. She does not watch salt intake. She does have borderline high blood pressure.     She does want to talk about her weight gain, she feels she has gained a lot of weight over the last couple years, she does exercise and she eats healthy and she feels that no matter what she does she continues to gain weight. She does have a family history of  obesity, she watches her  weight because of this. She also sweats a lot even with minimal movement.     She does have a IUD and she does not get her periods.     She states her mom and grandma did go early menopause.       Review of Systems   Constitutional, HEENT, cardiovascular, pulmonary, gi and gu systems are negative, except as otherwise noted.      Objective    /82 (BP Location: Right arm, Patient Position: Sitting, Cuff Size: Adult Large)   Pulse 93   Temp 98.2  F (36.8  C) (Oral)   Resp 18   Ht 1.765 m (5' 9.5\")   Wt 110.5 kg (243 lb 8 oz)   LMP  (LMP Unknown)   SpO2 93%   BMI 35.44 kg/m    Body mass index is 35.44 kg/m .  Physical Exam   GENERAL: healthy, alert and no distress  NECK: no adenopathy, no asymmetry, masses, or scars and thyroid normal to palpation  RESP: lungs clear to auscultation - no rales, rhonchi or wheezes  CV: regular rates and rhythm, normal S1 S2, no S3 or S4, no murmur, click or rub, peripheral pulses strong, and 1+ bilateral lower extremity pitting edema to prepatellar region     ABDOMEN: soft, nontender, no hepatosplenomegaly, no masses and bowel sounds normal  MS: no gross musculoskeletal defects noted, no edema                      "

## 2023-08-17 ENCOUNTER — MYC MEDICAL ADVICE (OUTPATIENT)
Dept: FAMILY MEDICINE | Facility: CLINIC | Age: 38
End: 2023-08-17
Payer: COMMERCIAL

## 2023-08-17 LAB
ANION GAP SERPL CALCULATED.3IONS-SCNC: 12 MMOL/L (ref 7–15)
BUN SERPL-MCNC: 11.3 MG/DL (ref 6–20)
CALCIUM SERPL-MCNC: 9.5 MG/DL (ref 8.6–10)
CHLORIDE SERPL-SCNC: 105 MMOL/L (ref 98–107)
CREAT SERPL-MCNC: 0.9 MG/DL (ref 0.51–0.95)
DEPRECATED HCO3 PLAS-SCNC: 21 MMOL/L (ref 22–29)
GFR SERPL CREATININE-BSD FRML MDRD: 83 ML/MIN/1.73M2
GLUCOSE SERPL-MCNC: 94 MG/DL (ref 70–99)
POTASSIUM SERPL-SCNC: 4.6 MMOL/L (ref 3.4–5.3)
SODIUM SERPL-SCNC: 138 MMOL/L (ref 136–145)
TSH SERPL DL<=0.005 MIU/L-ACNC: 0.75 UIU/ML (ref 0.3–4.2)

## 2023-08-28 NOTE — TELEPHONE ENCOUNTER
Lyndsey,   Can you make an appointment to discuss weight loss options with me, and just to let you know most of the muscle relaxer's have a side effect of sensitivity to the sun, I can change it to Zanaflex to use. Or if you want you can stay on the same one, and just be careful with being in the sun  Let me know  Alejandrina Dai, NP on 8/27/2023 at 9:30 PM

## 2023-08-29 DIAGNOSIS — M54.50 ACUTE BILATERAL LOW BACK PAIN WITHOUT SCIATICA: ICD-10-CM

## 2023-08-29 RX ORDER — CYCLOBENZAPRINE HCL 10 MG
TABLET ORAL
Qty: 90 TABLET | Refills: 0 | Status: SHIPPED | OUTPATIENT
Start: 2023-08-29 | End: 2023-10-17

## 2023-09-05 ENCOUNTER — LAB (OUTPATIENT)
Dept: LAB | Facility: CLINIC | Age: 38
End: 2023-09-05
Payer: COMMERCIAL

## 2023-09-05 DIAGNOSIS — Z51.81 ENCOUNTER FOR THERAPEUTIC DRUG MONITORING: Primary | ICD-10-CM

## 2023-09-05 DIAGNOSIS — L93.1 SUBACUTE CUTANEOUS LUPUS ERYTHEMATOSUS: ICD-10-CM

## 2023-09-05 LAB
ALT SERPL W P-5'-P-CCNC: 22 U/L (ref 0–50)
AST SERPL W P-5'-P-CCNC: 27 U/L (ref 0–45)
BASOPHILS # BLD AUTO: 0.1 10E3/UL (ref 0–0.2)
BASOPHILS NFR BLD AUTO: 1 %
EOSINOPHIL # BLD AUTO: 0.5 10E3/UL (ref 0–0.7)
EOSINOPHIL NFR BLD AUTO: 5 %
ERYTHROCYTE [DISTWIDTH] IN BLOOD BY AUTOMATED COUNT: 13.1 % (ref 10–15)
HCT VFR BLD AUTO: 42.4 % (ref 35–47)
HGB BLD-MCNC: 14 G/DL (ref 11.7–15.7)
IMM GRANULOCYTES # BLD: 0 10E3/UL
IMM GRANULOCYTES NFR BLD: 0 %
LYMPHOCYTES # BLD AUTO: 3 10E3/UL (ref 0.8–5.3)
LYMPHOCYTES NFR BLD AUTO: 31 %
MCH RBC QN AUTO: 31.3 PG (ref 26.5–33)
MCHC RBC AUTO-ENTMCNC: 33 G/DL (ref 31.5–36.5)
MCV RBC AUTO: 95 FL (ref 78–100)
MONOCYTES # BLD AUTO: 0.9 10E3/UL (ref 0–1.3)
MONOCYTES NFR BLD AUTO: 9 %
NEUTROPHILS # BLD AUTO: 5.2 10E3/UL (ref 1.6–8.3)
NEUTROPHILS NFR BLD AUTO: 53 %
PLATELET # BLD AUTO: 314 10E3/UL (ref 150–450)
RBC # BLD AUTO: 4.48 10E6/UL (ref 3.8–5.2)
WBC # BLD AUTO: 9.8 10E3/UL (ref 4–11)

## 2023-09-05 PROCEDURE — 36415 COLL VENOUS BLD VENIPUNCTURE: CPT

## 2023-09-05 PROCEDURE — 84460 ALANINE AMINO (ALT) (SGPT): CPT

## 2023-09-05 PROCEDURE — 85025 COMPLETE CBC W/AUTO DIFF WBC: CPT

## 2023-09-05 PROCEDURE — 84450 TRANSFERASE (AST) (SGOT): CPT

## 2023-09-07 ENCOUNTER — VIRTUAL VISIT (OUTPATIENT)
Dept: FAMILY MEDICINE | Facility: CLINIC | Age: 38
End: 2023-09-07
Payer: COMMERCIAL

## 2023-09-07 DIAGNOSIS — E66.01 CLASS 2 SEVERE OBESITY DUE TO EXCESS CALORIES WITH SERIOUS COMORBIDITY AND BODY MASS INDEX (BMI) OF 35.0 TO 35.9 IN ADULT (H): Primary | ICD-10-CM

## 2023-09-07 DIAGNOSIS — E66.812 CLASS 2 SEVERE OBESITY DUE TO EXCESS CALORIES WITH SERIOUS COMORBIDITY AND BODY MASS INDEX (BMI) OF 35.0 TO 35.9 IN ADULT (H): Primary | ICD-10-CM

## 2023-09-07 PROCEDURE — 99213 OFFICE O/P EST LOW 20 MIN: CPT | Mod: VID | Performed by: NURSE PRACTITIONER

## 2023-09-07 RX ORDER — PHENTERMINE HYDROCHLORIDE 37.5 MG/1
37.5 CAPSULE ORAL EVERY MORNING
Qty: 30 CAPSULE | Refills: 5 | Status: SHIPPED | OUTPATIENT
Start: 2023-09-07 | End: 2024-06-18

## 2023-09-07 NOTE — PROGRESS NOTES
"Lyndsey is a 38 year old who is being evaluated via a billable video visit.      How would you like to obtain your AVS? MyChart  If the video visit is dropped, the invitation should be resent by: Text to cell phone: [phone]  Will anyone else be joining your video visit? No          Assessment & Plan     Class 2 severe obesity due to excess calories with serious comorbidity and body mass index (BMI) of 35.0 to 35.9 in adult (H)  Insurance would not cover wegovy, discussed use of phentermine, will trial    - phentermine (ADIPEX-P) 37.5 MG capsule  Dispense: 30 capsule; Refill: 5    Alejandrina Dai NP  [facility]    Sheba Solorio is a 38 year old, presenting for the following health issues:  Weight Loss (Weight loss options)      9/7/2023     4:32 PM   Additional Questions   Roomed by Peggy Turk       History of Present Illness       Reason for visit:  Weight loss medication    She eats 2-3 servings of fruits and vegetables daily.She consumes 3 sweetened beverage(s) daily.She exercises with enough effort to increase her heart rate 60 or more minutes per day.  She exercises with enough effort to increase her heart rate 6 days per week.   She is taking medications regularly.        Review of Systems   Constitutional, HEENT, cardiovascular, pulmonary, gi and gu systems are negative, except as otherwise noted.      Objective    Vitals - Patient Reported  Weight (Patient Reported): 112.5 kg (248 lb)  Height (Patient Reported): 176.5 cm (5' 9.5\")  BMI (Based on Pt Reported Ht/Wt): 36.1        Physical Exam   GENERAL: Healthy, alert and no distress  EYES: Eyes grossly normal to inspection.  No discharge or erythema, or obvious scleral/conjunctival abnormalities.  RESP: No audible wheeze, cough, or visible cyanosis.  No visible retractions or increased work of breathing.    SKIN: Visible skin clear. No significant rash, abnormal pigmentation or lesions.  NEURO: Cranial nerves grossly " intact.  Mentation and speech appropriate for age.  PSYCH: Mentation appears normal, affect normal/bright, judgement and insight intact, normal speech and appearance well-groomed.          Video-Visit Details    Type of service:  Video Visit     Originating Location (pt. Location): Home    Distant Location (provider location):  Off-site  Platform used for Video Visit: [employer]

## 2023-10-16 DIAGNOSIS — E66.812 CLASS 2 SEVERE OBESITY DUE TO EXCESS CALORIES WITH SERIOUS COMORBIDITY AND BODY MASS INDEX (BMI) OF 35.0 TO 35.9 IN ADULT (H): ICD-10-CM

## 2023-10-16 DIAGNOSIS — E66.01 CLASS 2 SEVERE OBESITY DUE TO EXCESS CALORIES WITH SERIOUS COMORBIDITY AND BODY MASS INDEX (BMI) OF 35.0 TO 35.9 IN ADULT (H): ICD-10-CM

## 2023-10-17 ENCOUNTER — MYC REFILL (OUTPATIENT)
Dept: FAMILY MEDICINE | Facility: CLINIC | Age: 38
End: 2023-10-17
Payer: COMMERCIAL

## 2023-10-17 ENCOUNTER — MYC MEDICAL ADVICE (OUTPATIENT)
Dept: FAMILY MEDICINE | Facility: CLINIC | Age: 38
End: 2023-10-17
Payer: COMMERCIAL

## 2023-10-17 DIAGNOSIS — E66.01 CLASS 2 SEVERE OBESITY DUE TO EXCESS CALORIES WITH SERIOUS COMORBIDITY AND BODY MASS INDEX (BMI) OF 35.0 TO 35.9 IN ADULT (H): Primary | ICD-10-CM

## 2023-10-17 DIAGNOSIS — M54.50 ACUTE BILATERAL LOW BACK PAIN WITHOUT SCIATICA: ICD-10-CM

## 2023-10-17 DIAGNOSIS — E66.812 CLASS 2 SEVERE OBESITY DUE TO EXCESS CALORIES WITH SERIOUS COMORBIDITY AND BODY MASS INDEX (BMI) OF 35.0 TO 35.9 IN ADULT (H): Primary | ICD-10-CM

## 2023-10-17 RX ORDER — CYCLOBENZAPRINE HCL 10 MG
10 TABLET ORAL 3 TIMES DAILY PRN
Qty: 90 TABLET | Refills: 0 | Status: SHIPPED | OUTPATIENT
Start: 2023-10-17 | End: 2024-01-04

## 2023-10-17 NOTE — TELEPHONE ENCOUNTER
Sent [employer] message requesting a call back for an appt. Two more attempts will be made.    Aga Peoples  Lead

## 2023-10-17 NOTE — TELEPHONE ENCOUNTER
One month fill provided, pt will need f/u appt for ongoing refill.  Please call to schedule CPE.    Jignesh Fine MD

## 2023-10-17 NOTE — LETTER
November 1, 2023      Diana Roblero  [id number] [facility] [address]        Federica Solorio,    We recently received a call from your pharmacy requesting a refill request for your medication phentermine (ADIPEX-P) 37.5 MG tablet. We are contacting you today to notify you that you are due for an OFFICE VISIT for further refills.     We have authorized a one time refill of your medication to allow time for you to schedule your appointment.     Please call [phone] to schedule an appointment or if you have MyChart you can schedule with your provider as well.     Taking care of your health is important to us, and ongoing visits with your provider are vital to your care. We look forward to seeing you in the near future.     Thank you for using [facility] for your medical needs.     Sincerely,        Jignesh Fine MD           79.4

## 2023-10-17 NOTE — TELEPHONE ENCOUNTER
Refill Request, Medication Problem (phentermine (ADIPEX-P) 37.5 MG capsule CAPSULES ARE ON BACK ORDER, COULD YOU PLEASE SEND RX FOR TABLETS?)     Order pended. Rosie Guevara R.N.

## 2023-10-18 RX ORDER — PHENTERMINE HYDROCHLORIDE 37.5 MG/1
37.5 TABLET ORAL
Qty: 30 TABLET | Refills: 5 | Status: SHIPPED | OUTPATIENT
Start: 2023-10-18 | End: 2024-06-18

## 2023-10-24 RX ORDER — PHENTERMINE HYDROCHLORIDE 37.5 MG/1
37.5 TABLET ORAL
Qty: 30 TABLET | Refills: 5 | Status: SHIPPED | OUTPATIENT
Start: 2023-10-24 | End: 2024-06-18

## 2024-01-04 ENCOUNTER — VIRTUAL VISIT (OUTPATIENT)
Dept: FAMILY MEDICINE | Facility: CLINIC | Age: 39
End: 2024-01-04
Payer: COMMERCIAL

## 2024-01-04 DIAGNOSIS — L93.0 LUPUS ERYTHEMATOSUS, UNSPECIFIED FORM: Primary | ICD-10-CM

## 2024-01-04 DIAGNOSIS — M54.50 ACUTE BILATERAL LOW BACK PAIN WITHOUT SCIATICA: ICD-10-CM

## 2024-01-04 PROCEDURE — 99213 OFFICE O/P EST LOW 20 MIN: CPT | Mod: 95 | Performed by: NURSE PRACTITIONER

## 2024-01-04 RX ORDER — DAPSONE 100 MG/1
TABLET ORAL
Qty: 90 TABLET | Refills: 3 | Status: SHIPPED | OUTPATIENT
Start: 2024-01-04 | End: 2024-01-11

## 2024-01-04 RX ORDER — CYCLOBENZAPRINE HCL 10 MG
10 TABLET ORAL 3 TIMES DAILY PRN
Qty: 90 TABLET | Refills: 3 | Status: SHIPPED | OUTPATIENT
Start: 2024-01-04

## 2024-01-04 ASSESSMENT — PATIENT HEALTH QUESTIONNAIRE - PHQ9: SUM OF ALL RESPONSES TO PHQ QUESTIONS 1-9: 5

## 2024-01-04 NOTE — PROGRESS NOTES
"Lyndsey is a 38 year old who is being evaluated via a billable video visit.      How would you like to obtain your AVS? MyChart  If the video visit is dropped, the invitation should be resent by: Text to cell phone: [phone]  Will anyone else be joining your video visit? No          Assessment & Plan     Acute bilateral low back pain without sciatica  refilled  - cyclobenzaprine (FLEXERIL) 10 MG tablet  Dispense: 90 tablet; Refill: 3    Lupus erythematosus, unspecified form  refilled  - dapsone (ACZONE) 100 MG tablet  Dispense: 90 tablet; Refill: 3      Alejandrina Sr NP  [facility]    Sheba Solorio is a 38 year old, presenting for the following health issues:  Recheck Medication (Flexeril)      1/4/2024     2:37 PM   Additional Questions   Roomed by LAKE Paige   Accompanied by Self       History of Present Illness       Reason for visit:  Medication    She eats 0-1 servings of fruits and vegetables daily.She consumes 3 sweetened beverage(s) daily.She exercises with enough effort to increase her heart rate 60 or more minutes per day.  She exercises with enough effort to increase her heart rate 5 days per week.   She is taking medications regularly.     She is wanting to have flexeril refilled, she states that it works for her  She was also on dapsone   Review of Systems         Objective    Vitals - Patient Reported  Weight (Patient Reported): 90.7 kg (200 lb)  Height (Patient Reported): 175.3 cm (5' 9\")  BMI (Based on Pt Reported Ht/Wt): 29.53        Physical Exam   GENERAL: Healthy, alert and no distress  EYES: Eyes grossly normal to inspection.  No discharge or erythema, or obvious scleral/conjunctival abnormalities.  RESP: No audible wheeze, cough, or visible cyanosis.  No visible retractions or increased work of breathing.    SKIN: Visible skin clear. No significant rash, abnormal pigmentation or lesions.  NEURO: Cranial nerves grossly intact.  Mentation and speech " appropriate for age.  PSYCH: Mentation appears normal, affect normal/bright, judgement and insight intact, normal speech and appearance well-groomed.                Video-Visit Details    Type of service:  Video Visit     Originating Location (pt. Location): Home    Distant Location (provider location):  On-site  Platform used for Video Visit: [employer]      Answers submitted by the patient for this visit:  General Questionnaire (Submitted on 1/4/2024)  Chief Complaint: Chronic problems general questions HPI Form  What is the reason for your visit today? : Medication  How many servings of fruits and vegetables do you eat daily?: 0-1  On average, how many sweetened beverages do you drink each day (Examples: soda, juice, sweet tea, etc.  Do NOT count diet or artificially sweetened beverages)?: 3  How many minutes a day do you exercise enough to make your heart beat faster?: 60 or more  How many days a week do you exercise enough to make your heart beat faster?: 5  How many days per week do you miss taking your medication?: 0

## 2024-01-11 ENCOUNTER — MYC REFILL (OUTPATIENT)
Dept: FAMILY MEDICINE | Facility: CLINIC | Age: 39
End: 2024-01-11
Payer: COMMERCIAL

## 2024-01-11 DIAGNOSIS — L93.0 LUPUS ERYTHEMATOSUS, UNSPECIFIED FORM: ICD-10-CM

## 2024-01-12 RX ORDER — DAPSONE 100 MG/1
TABLET ORAL
Qty: 90 TABLET | Refills: 3 | Status: SHIPPED | OUTPATIENT
Start: 2024-01-12 | End: 2024-06-18

## 2024-04-14 ENCOUNTER — HEALTH MAINTENANCE LETTER (OUTPATIENT)
Age: 39
End: 2024-04-14

## 2024-06-17 ASSESSMENT — PATIENT HEALTH QUESTIONNAIRE - PHQ9
10. IF YOU CHECKED OFF ANY PROBLEMS, HOW DIFFICULT HAVE THESE PROBLEMS MADE IT FOR YOU TO DO YOUR WORK, TAKE CARE OF THINGS AT HOME, OR GET ALONG WITH OTHER PEOPLE: SOMEWHAT DIFFICULT
SUM OF ALL RESPONSES TO PHQ QUESTIONS 1-9: 4
SUM OF ALL RESPONSES TO PHQ QUESTIONS 1-9: 4

## 2024-06-18 ENCOUNTER — OFFICE VISIT (OUTPATIENT)
Dept: FAMILY MEDICINE | Facility: CLINIC | Age: 39
End: 2024-06-18
Payer: COMMERCIAL

## 2024-06-18 VITALS
BODY MASS INDEX: 36.88 KG/M2 | TEMPERATURE: 98.8 F | DIASTOLIC BLOOD PRESSURE: 87 MMHG | HEIGHT: 70 IN | RESPIRATION RATE: 18 BRPM | OXYGEN SATURATION: 94 % | WEIGHT: 257.6 LBS | HEART RATE: 103 BPM | SYSTOLIC BLOOD PRESSURE: 149 MMHG

## 2024-06-18 DIAGNOSIS — E66.812 CLASS 2 SEVERE OBESITY DUE TO EXCESS CALORIES WITH SERIOUS COMORBIDITY AND BODY MASS INDEX (BMI) OF 35.0 TO 35.9 IN ADULT (H): ICD-10-CM

## 2024-06-18 DIAGNOSIS — F33.1 MAJOR DEPRESSIVE DISORDER, RECURRENT EPISODE, MODERATE (H): ICD-10-CM

## 2024-06-18 DIAGNOSIS — L93.1 SUBACUTE CUTANEOUS LUPUS ERYTHEMATOSUS: ICD-10-CM

## 2024-06-18 DIAGNOSIS — E66.01 CLASS 2 SEVERE OBESITY DUE TO EXCESS CALORIES WITH SERIOUS COMORBIDITY AND BODY MASS INDEX (BMI) OF 35.0 TO 35.9 IN ADULT (H): ICD-10-CM

## 2024-06-18 DIAGNOSIS — M54.9 UPPER BACK PAIN: ICD-10-CM

## 2024-06-18 DIAGNOSIS — S46.811A STRAIN OF RIGHT TRAPEZIUS MUSCLE, INITIAL ENCOUNTER: ICD-10-CM

## 2024-06-18 PROCEDURE — 99214 OFFICE O/P EST MOD 30 MIN: CPT | Performed by: FAMILY MEDICINE

## 2024-06-18 RX ORDER — PHENTERMINE HYDROCHLORIDE 37.5 MG/1
37.5 TABLET ORAL
Qty: 30 TABLET | Refills: 5 | Status: SHIPPED | OUTPATIENT
Start: 2024-06-18

## 2024-06-18 RX ORDER — PREDNISONE 20 MG/1
40 TABLET ORAL DAILY
Qty: 10 TABLET | Refills: 0 | Status: SHIPPED | OUTPATIENT
Start: 2024-06-18 | End: 2024-06-23

## 2024-06-18 NOTE — PROGRESS NOTES
"  Assessment & Plan     (M54.9) Upper back pain  Comment: History of lupus   Works as the [email or web address] tech   Complaining of pain in the right shoulder upper back .  Movement makes the pain worst   No shortness of breath   No arm numbness or tingling .       (S49.518E) Strain of right trapezius muscle, initial encounter  Comment: discussed ice , tylenol , lidocaine patch , ibuprofen   Will use short term prednisone .  Plan: predniSONE (DELTASONE) 20 MG tablet            (L93.1) Subacute cutaneous lupus erythematosus  Comment: on prednisone   Experiencing more arthralgia , muscle pain .   Will arrange rheumatology consult .  Plan: Adult Rheumatology  Referral            (E66.01,  Z68.35) Class 2 severe obesity due to excess calories with serious comorbidity and body mass index (BMI) of 35.0 to 35.9 in adult (H)  Comment: we discussed medication side effects .  recommend  Plan: phentermine (ADIPEX-P) 37.5 MG tablet            (F33.1) Major depressive disorder, recurrent episode, moderate (H)  Comment:stable on the Buspar , Wellbutrin and lexapro      Nicotine/Tobacco Cessation  She reports that she has been smoking cigarettes. She has been exposed to tobacco smoke. She has never used smokeless tobacco.  Nicotine/Tobacco Cessation Plan  Self help information given to patient      BMI  Estimated body mass index is 37.5 kg/m  as calculated from the following:    Height as of this encounter: 1.765 m (5' 9.5\").    Weight as of this encounter: 116.8 kg (257 lb 9.6 oz).   Weight management plan: Discussed healthy diet and exercise guidelines        Sheba Solorio is a 39 year old, presenting for the following health issues:  Shoulder Pain (Right shoulder pain for the past week, gradually getting worse, possible knot, radiating towards middle/lower back )        6/18/2024     7:40 AM   Additional Questions   Roomed by LAKE Paige   Accompanied by Self         6/18/2024     7:40 AM   Patient Reported Additional " Medications   Patient reports taking the following new medications Patient is currently taking Prednisone for Lupus flare up     Shoulder Pain    History of Present Illness       Back Pain:  She presents for follow up of back pain. Patient's back pain is a new problem.    Original cause of back pain: not sure  First noticed back pain: 1-4 weeks ago  Patient feels back pain: constantlyLocation of back pain:  Right upper back and right shoulder  Description of back pain: burning, sharp, shooting and stabbing  Back pain spreads: right shoulder    Since patient first noticed back pain, pain is: rapidly worsening  Does back pain interfere with her job:  Yes  On a scale of 1-10 (10 being the worst), patient describes pain as:  9  What makes back pain worse: bending, certain positions, standing and twisting   Acupuncture: not tried  Acetaminophen: not helpful  Activity or exercise: not helpful  Chiropractor:  Not tried  Cold: not tried  Heat: helpful  Massage: not helpful  Muscle relaxants: not helpful  NSAIDS: not helpful  Opioids: not tried  Physical Therapy: not helpful  Rest: helpful  Steroid Injection: not tried  Stretching: not helpful  Surgery: not tried  TENS unit: not tried  Topical pain relievers: not helpful  Other healthcare providers patient is seeing for back pain: None    She eats 0-1 servings of fruits and vegetables daily.She consumes 2 sweetened beverage(s) daily.She exercises with enough effort to increase her heart rate 10 to 19 minutes per day.  She exercises with enough effort to increase her heart rate 6 days per week.   She is taking medications regularly.        Review of Systems  CONSTITUTIONAL: NEGATIVE for fever, chills, change in weight  INTEGUMENTARY/SKIN: NEGATIVE for worrisome rashes, moles or lesions  EYES: NEGATIVE for vision changes or irritation  ENT/MOUTH: NEGATIVE for ear, mouth and throat problems  RESP: NEGATIVE for significant cough or SOB  BREAST: NEGATIVE for masses, tenderness or  "discharge  CV: NEGATIVE for chest pain, palpitations or peripheral edema  GI: NEGATIVE for nausea, abdominal pain, heartburn, or change in bowel habits  : NEGATIVE for frequency, dysuria, or hematuria  MUSCULOSKELETAL: NEGATIVE for significant arthralgias or myalgia  NEURO: NEGATIVE for weakness, dizziness or paresthesias  ENDOCRINE: NEGATIVE for temperature intolerance, skin/hair changes  HEME: NEGATIVE for bleeding problems  PSYCHIATRIC: NEGATIVE for changes in mood or affect      Objective    BP (!) 149/87 (BP Location: Left arm, Patient Position: Sitting, Cuff Size: Adult Large)   Pulse 103   Temp 98.8  F (37.1  C) (Oral)   Resp 18   Ht 1.765 m (5' 9.5\")   Wt 116.8 kg (257 lb 9.6 oz)   LMP 05/28/2024 (Approximate)   SpO2 94%   Breastfeeding No   BMI 37.50 kg/m    Body mass index is 37.5 kg/m .  Physical Exam   GENERAL: alert and no distress    NECK: no adenopathy, no asymmetry, masses, or scars  RESP: lungs clear to auscultation - no rales, rhonchi or wheezes  CV: regular rate and rhythm, normal S1 S2, no S3 or S4, no murmur, click or rub, no peripheral edema  ABDOMEN: soft, nontender, no hepatosplenomegaly, no masses and bowel sounds normal  MS:normal strength . Limited range of motion right shoulder .       Signed Electronically by: Emily Cespedes MD    "

## 2024-06-23 ENCOUNTER — HEALTH MAINTENANCE LETTER (OUTPATIENT)
Age: 39
End: 2024-06-23

## 2024-07-08 ENCOUNTER — MYC MEDICAL ADVICE (OUTPATIENT)
Dept: FAMILY MEDICINE | Facility: CLINIC | Age: 39
End: 2024-07-08
Payer: COMMERCIAL

## 2024-12-07 ENCOUNTER — TELEPHONE (OUTPATIENT)
Dept: NURSING | Facility: CLINIC | Age: 39
End: 2024-12-07
Payer: COMMERCIAL

## 2024-12-07 ENCOUNTER — MYC MEDICAL ADVICE (OUTPATIENT)
Dept: FAMILY MEDICINE | Facility: CLINIC | Age: 39
End: 2024-12-07
Payer: COMMERCIAL

## 2024-12-07 NOTE — TELEPHONE ENCOUNTER
"Nurse Triage SBAR    Is this a 2nd Level Triage? NO    Situation: Refill request      Background: Cyclobenzaprine order on file     Assessment: Pt states that she requested a refill and it says \"no refills available\".     Protocol Recommended Disposition:   No disposition on file.    Recommendation: Pt was advised to contact her PCP for refills during office hours or call her pharmacy.      Dai Grove RN   Triage Nurse Advisor on 12/7/2024 at 11:59 AM      "

## 2025-05-20 ASSESSMENT — PATIENT HEALTH QUESTIONNAIRE - PHQ9
SUM OF ALL RESPONSES TO PHQ QUESTIONS 1-9: 4
SUM OF ALL RESPONSES TO PHQ QUESTIONS 1-9: 4
10. IF YOU CHECKED OFF ANY PROBLEMS, HOW DIFFICULT HAVE THESE PROBLEMS MADE IT FOR YOU TO DO YOUR WORK, TAKE CARE OF THINGS AT HOME, OR GET ALONG WITH OTHER PEOPLE: SOMEWHAT DIFFICULT

## 2025-05-21 ENCOUNTER — RESULTS FOLLOW-UP (OUTPATIENT)
Dept: FAMILY MEDICINE | Facility: CLINIC | Age: 40
End: 2025-05-21

## 2025-05-21 ENCOUNTER — OFFICE VISIT (OUTPATIENT)
Dept: FAMILY MEDICINE | Facility: CLINIC | Age: 40
End: 2025-05-21
Payer: COMMERCIAL

## 2025-05-21 VITALS
SYSTOLIC BLOOD PRESSURE: 156 MMHG | HEART RATE: 92 BPM | OXYGEN SATURATION: 97 % | DIASTOLIC BLOOD PRESSURE: 92 MMHG | RESPIRATION RATE: 16 BRPM | WEIGHT: 230 LBS | BODY MASS INDEX: 32.93 KG/M2 | TEMPERATURE: 98.5 F | HEIGHT: 70 IN

## 2025-05-21 DIAGNOSIS — K92.2 LOWER GI BLEED: Primary | ICD-10-CM

## 2025-05-21 DIAGNOSIS — M54.50 ACUTE BILATERAL LOW BACK PAIN WITHOUT SCIATICA: ICD-10-CM

## 2025-05-21 DIAGNOSIS — R03.0 ELEVATED BLOOD PRESSURE READING WITHOUT DIAGNOSIS OF HYPERTENSION: ICD-10-CM

## 2025-05-21 LAB
ERYTHROCYTE [DISTWIDTH] IN BLOOD BY AUTOMATED COUNT: 12 % (ref 10–15)
HCT VFR BLD AUTO: 45.4 % (ref 35–47)
HGB BLD-MCNC: 15.5 G/DL (ref 11.7–15.7)
MCH RBC QN AUTO: 30.6 PG (ref 26.5–33)
MCHC RBC AUTO-ENTMCNC: 34.1 G/DL (ref 31.5–36.5)
MCV RBC AUTO: 90 FL (ref 78–100)
PLATELET # BLD AUTO: 317 10E3/UL (ref 150–450)
RBC # BLD AUTO: 5.06 10E6/UL (ref 3.8–5.2)
WBC # BLD AUTO: 6.5 10E3/UL (ref 4–11)

## 2025-05-21 PROCEDURE — 3079F DIAST BP 80-89 MM HG: CPT | Performed by: FAMILY MEDICINE

## 2025-05-21 PROCEDURE — 85027 COMPLETE CBC AUTOMATED: CPT | Performed by: FAMILY MEDICINE

## 2025-05-21 PROCEDURE — 3077F SYST BP >= 140 MM HG: CPT | Performed by: FAMILY MEDICINE

## 2025-05-21 PROCEDURE — 99214 OFFICE O/P EST MOD 30 MIN: CPT | Performed by: FAMILY MEDICINE

## 2025-05-21 PROCEDURE — 36415 COLL VENOUS BLD VENIPUNCTURE: CPT | Performed by: FAMILY MEDICINE

## 2025-05-21 RX ORDER — CYCLOBENZAPRINE HCL 10 MG
10 TABLET ORAL 3 TIMES DAILY PRN
Qty: 90 TABLET | Refills: 1 | Status: SHIPPED | OUTPATIENT
Start: 2025-05-21

## 2025-05-21 ASSESSMENT — ENCOUNTER SYMPTOMS: BACK PAIN: 1

## 2025-05-21 NOTE — PROGRESS NOTES
"  Assessment & Plan     Lower GI bleed  History of hemorrhoids, low family risk of colon cancer.  Currently asymptomatic, she had 2 isolated episodes of lower GI bleeding in the stool with resolution of symptoms.  Recommend she starts FiberCon, CBC recheck shows improvement in hemoglobin.  If she has redemonstrated episodes of this in the next month, I recommend diagnostic colonoscopy.  - calcium polycarbophil (FIBERCON) 625 MG tablet  Dispense: 60 tablet; Refill: 0  - CBC with platelets  - CBC with platelets    Acute bilateral low back pain without sciatica  Acute on chronic, likely muscular strain, no other red flag symptoms.  Okay to continue Flexeril, refilled.  - cyclobenzaprine (FLEXERIL) 10 MG tablet  Dispense: 90 tablet; Refill: 1    Elevated blood pressure reading without diagnosis of hypertension   Possibly secondary to pain, recommend recheck in 1 month.        Sheba Solorio is a 40 year old, presenting for the following health issues:  Rectal Problem (Blood in stool), Back Pain, and Headache        5/21/2025     7:27 AM   Additional Questions   Roomed by Sharita RIVAS     Back Pain     History of Present Illness       Back Pain:  She presents for follow up of back pain. Patient's back pain is a chronic problem.  Location of back pain:  Right middle of back, left middle of back and right shoulder  Description of back pain: dull ache and other  Back pain spreads: nowhere    Since patient first noticed back pain, pain is: always present, but gets better and worse  Does back pain interfere with her job:  Yes       Headaches:   Since the patient's last clinic visit, headaches are: worsened  The patient is getting headaches:  Headaches 4 out of the last 7 days  She is not able to do normal daily activities when she has a migraine.  The patient is taking the following rescue/relief medications:  Ibuprofen (Advil, Motrin) and Tylenol   Patient states \"The relief is inconsistent\" from the rescue/relief " "medications.   The patient is taking the following medications to prevent migraines:  No medications to prevent migraines  In the past 4 weeks, the patient has gone to an Urgent Care or Emergency Room 0 times times due to headaches.    Reason for visit:  Robert blood in toilet after bowel movements x 3 times  Symptom onset:  1-2 weeks ago  Symptoms include:  Robert blood in toilet after bowel movements, no pain associated  Symptom intensity:  Mild  Symptom progression:  Improving  Had these symptoms before:  Yes  Has tried/received treatment for these symptoms:  No  What makes it worse:  Not applicable - no pain associated  What makes it better:  Not applicable - not painful or uncomfortable   She is taking medications regularly.      2 episode of bright red blood in the stool almost like a menstrual bleed; last week.  Normally has a bowel movement every other day, sometimes straining, she has felt external hemorrhoids in the past.  No fever or unintentional weight loss.  No first-degree relative with history of colon cancer.  Understandably, her  who has had stage IV colon cancer encouraged to get further evaluation.     Additionally also gets some back pain, nonradiating in the sides of her lower back, Flexeril helpful and requesting a refill.                  Objective    BP (!) 142/82 (BP Location: Right arm, Patient Position: Sitting, Cuff Size: Adult Large)   Pulse 92   Temp 98.5  F (36.9  C)   Resp 16   Ht 1.765 m (5' 9.5\")   Wt 104.3 kg (230 lb)   LMP 05/20/2025 (Exact Date)   SpO2 97%   BMI 33.48 kg/m    Body mass index is 33.48 kg/m .  Physical Exam  Abdominal:      General: Abdomen is flat. There is no distension.      Palpations: Abdomen is soft. There is no mass.      Tenderness: There is no abdominal tenderness.   Musculoskeletal:      Comments: Lumbar paraspinal tenderness, no spinous process tenderness.            Results for orders placed or performed in visit on 05/21/25   CBC with " platelets     Status: Normal   Result Value Ref Range    WBC Count 6.5 4.0 - 11.0 10e3/uL    RBC Count 5.06 3.80 - 5.20 10e6/uL    Hemoglobin 15.5 11.7 - 15.7 g/dL    Hematocrit 45.4 35.0 - 47.0 %    MCV 90 78 - 100 fL    MCH 30.6 26.5 - 33.0 pg    MCHC 34.1 31.5 - 36.5 g/dL    RDW 12.0 10.0 - 15.0 %    Platelet Count 317 150 - 450 10e3/uL           Signed Electronically by: Clive Tristan MD

## 2025-07-12 ENCOUNTER — HEALTH MAINTENANCE LETTER (OUTPATIENT)
Age: 40
End: 2025-07-12

## 2025-08-15 ENCOUNTER — APPOINTMENT (OUTPATIENT)
Dept: ULTRASOUND IMAGING | Facility: CLINIC | Age: 40
End: 2025-08-15
Attending: PHYSICIAN ASSISTANT
Payer: COMMERCIAL

## 2025-08-15 ENCOUNTER — HOSPITAL ENCOUNTER (EMERGENCY)
Facility: CLINIC | Age: 40
Discharge: HOME | End: 2025-08-15
Attending: PHYSICIAN ASSISTANT | Admitting: PHYSICIAN ASSISTANT
Payer: COMMERCIAL

## 2025-08-15 VITALS
TEMPERATURE: 98.1 F | SYSTOLIC BLOOD PRESSURE: 168 MMHG | HEART RATE: 98 BPM | BODY MASS INDEX: 33.69 KG/M2 | WEIGHT: 231.48 LBS | OXYGEN SATURATION: 98 % | DIASTOLIC BLOOD PRESSURE: 86 MMHG | RESPIRATION RATE: 18 BRPM

## 2025-08-15 DIAGNOSIS — N83.201 RIGHT OVARIAN CYST: ICD-10-CM

## 2025-08-15 DIAGNOSIS — K80.20 GALLSTONES: ICD-10-CM

## 2025-08-15 DIAGNOSIS — B96.89 BV (BACTERIAL VAGINOSIS): Primary | ICD-10-CM

## 2025-08-15 DIAGNOSIS — M54.50 ACUTE BILATERAL LOW BACK PAIN WITHOUT SCIATICA: ICD-10-CM

## 2025-08-15 DIAGNOSIS — N93.9 VAGINAL SPOTTING: ICD-10-CM

## 2025-08-15 DIAGNOSIS — N76.0 BV (BACTERIAL VAGINOSIS): Primary | ICD-10-CM

## 2025-08-15 LAB
ALBUMIN SERPL BCG-MCNC: 4.1 G/DL (ref 3.5–5.2)
ALBUMIN UR-MCNC: NEGATIVE MG/DL
ALP SERPL-CCNC: 94 U/L (ref 40–150)
ALT SERPL W P-5'-P-CCNC: 14 U/L (ref 0–50)
AMORPH CRY #/AREA URNS HPF: ABNORMAL /HPF
ANION GAP SERPL CALCULATED.3IONS-SCNC: 10 MMOL/L (ref 7–15)
APPEARANCE UR: ABNORMAL
AST SERPL W P-5'-P-CCNC: 19 U/L (ref 0–45)
BILIRUB DIRECT SERPL-MCNC: <0.08 MG/DL (ref 0–0.3)
BILIRUB SERPL-MCNC: 0.2 MG/DL
BILIRUB UR QL STRIP: NEGATIVE
BUN SERPL-MCNC: 10 MG/DL (ref 6–20)
CALCIUM SERPL-MCNC: 9.2 MG/DL (ref 8.8–10.4)
CHLORIDE SERPL-SCNC: 104 MMOL/L (ref 98–107)
CLUE CELLS: PRESENT
COLOR UR AUTO: ABNORMAL
CREAT SERPL-MCNC: 0.8 MG/DL (ref 0.51–0.95)
EGFRCR SERPLBLD CKD-EPI 2021: >90 ML/MIN/1.73M2
ERYTHROCYTE [DISTWIDTH] IN BLOOD BY AUTOMATED COUNT: 12 % (ref 10–15)
GLUCOSE SERPL-MCNC: 98 MG/DL (ref 70–99)
GLUCOSE UR STRIP-MCNC: NEGATIVE MG/DL
HCG UR QL: NEGATIVE
HCO3 SERPL-SCNC: 23 MMOL/L (ref 22–29)
HCT VFR BLD AUTO: 43.7 % (ref 35–47)
HGB BLD-MCNC: 15 G/DL (ref 11.7–15.7)
HGB UR QL STRIP: ABNORMAL
HOLD SPECIMEN: NORMAL
KETONES UR STRIP-MCNC: NEGATIVE MG/DL
LEUKOCYTE ESTERASE UR QL STRIP: NEGATIVE
MCH RBC QN AUTO: 31.2 PG (ref 26.5–33)
MCHC RBC AUTO-ENTMCNC: 34.3 G/DL (ref 31.5–36.5)
MCV RBC AUTO: 90.9 FL (ref 78–100)
MUCOUS THREADS #/AREA URNS LPF: PRESENT /LPF
NITRATE UR QL: NEGATIVE
PH UR STRIP: 8 [PH] (ref 5–7)
PLATELET # BLD AUTO: 311 10E3/UL (ref 150–450)
POTASSIUM SERPL-SCNC: 4.2 MMOL/L (ref 3.4–5.3)
PROT SERPL-MCNC: 7.1 G/DL (ref 6.4–8.3)
RBC # BLD AUTO: 4.81 10E6/UL (ref 3.8–5.2)
RBC URINE: 1 /HPF
SODIUM SERPL-SCNC: 137 MMOL/L (ref 135–145)
SP GR UR STRIP: 1.02 (ref 1–1.03)
SQUAMOUS EPITHELIAL: 6 /HPF
TRICHOMONAS, WET PREP: ABNORMAL
UROBILINOGEN UR STRIP-MCNC: NORMAL MG/DL
WBC # BLD AUTO: 8.1 10E3/UL (ref 4–11)
WBC URINE: 2 /HPF
WBC'S/HIGH POWER FIELD, WET PREP: ABNORMAL
YEAST, WET PREP: ABNORMAL

## 2025-08-15 PROCEDURE — 80048 BASIC METABOLIC PNL TOTAL CA: CPT | Performed by: PHYSICIAN ASSISTANT

## 2025-08-15 PROCEDURE — 76770 US EXAM ABDO BACK WALL COMP: CPT

## 2025-08-15 PROCEDURE — 81025 URINE PREGNANCY TEST: CPT | Performed by: EMERGENCY MEDICINE

## 2025-08-15 PROCEDURE — 76856 US EXAM PELVIC COMPLETE: CPT

## 2025-08-15 PROCEDURE — 258N000003 HC RX IP 258 OP 636: Performed by: PHYSICIAN ASSISTANT

## 2025-08-15 PROCEDURE — 81001 URINALYSIS AUTO W/SCOPE: CPT | Performed by: PHYSICIAN ASSISTANT

## 2025-08-15 PROCEDURE — 250N000011 HC RX IP 250 OP 636: Performed by: PHYSICIAN ASSISTANT

## 2025-08-15 PROCEDURE — 82248 BILIRUBIN DIRECT: CPT | Performed by: PHYSICIAN ASSISTANT

## 2025-08-15 PROCEDURE — 96374 THER/PROPH/DIAG INJ IV PUSH: CPT | Performed by: PHYSICIAN ASSISTANT

## 2025-08-15 PROCEDURE — 99285 EMERGENCY DEPT VISIT HI MDM: CPT | Mod: 25 | Performed by: PHYSICIAN ASSISTANT

## 2025-08-15 PROCEDURE — 87210 SMEAR WET MOUNT SALINE/INK: CPT | Performed by: PHYSICIAN ASSISTANT

## 2025-08-15 PROCEDURE — 81025 URINE PREGNANCY TEST: CPT | Performed by: PHYSICIAN ASSISTANT

## 2025-08-15 PROCEDURE — 87491 CHLMYD TRACH DNA AMP PROBE: CPT | Performed by: PHYSICIAN ASSISTANT

## 2025-08-15 PROCEDURE — 36415 COLL VENOUS BLD VENIPUNCTURE: CPT | Performed by: EMERGENCY MEDICINE

## 2025-08-15 PROCEDURE — 96361 HYDRATE IV INFUSION ADD-ON: CPT | Performed by: PHYSICIAN ASSISTANT

## 2025-08-15 PROCEDURE — 85014 HEMATOCRIT: CPT | Performed by: PHYSICIAN ASSISTANT

## 2025-08-15 RX ORDER — KETOROLAC TROMETHAMINE 10 MG/1
10 TABLET, FILM COATED ORAL EVERY 6 HOURS PRN
Qty: 20 TABLET | Refills: 0 | Status: SHIPPED | OUTPATIENT
Start: 2025-08-15

## 2025-08-15 RX ORDER — KETOROLAC TROMETHAMINE 15 MG/ML
15 INJECTION, SOLUTION INTRAMUSCULAR; INTRAVENOUS ONCE
Status: DISCONTINUED | OUTPATIENT
Start: 2025-08-15 | End: 2025-08-15

## 2025-08-15 RX ORDER — CLINDAMYCIN HYDROCHLORIDE 300 MG/1
300 CAPSULE ORAL 2 TIMES DAILY
Qty: 14 CAPSULE | Refills: 0 | Status: SHIPPED | OUTPATIENT
Start: 2025-08-15 | End: 2025-08-22

## 2025-08-15 RX ORDER — ONDANSETRON 4 MG/1
4 TABLET, ORALLY DISINTEGRATING ORAL ONCE
Status: COMPLETED | OUTPATIENT
Start: 2025-08-15 | End: 2025-08-15

## 2025-08-15 RX ORDER — KETOROLAC TROMETHAMINE 30 MG/ML
30 INJECTION, SOLUTION INTRAMUSCULAR; INTRAVENOUS ONCE
Status: DISCONTINUED | OUTPATIENT
Start: 2025-08-15 | End: 2025-08-15

## 2025-08-15 RX ORDER — ONDANSETRON 4 MG/1
4 TABLET, ORALLY DISINTEGRATING ORAL EVERY 6 HOURS PRN
Qty: 10 TABLET | Refills: 0 | Status: SHIPPED | OUTPATIENT
Start: 2025-08-15 | End: 2025-08-18

## 2025-08-15 RX ORDER — KETOROLAC TROMETHAMINE 15 MG/ML
15 INJECTION, SOLUTION INTRAMUSCULAR; INTRAVENOUS ONCE
Status: COMPLETED | OUTPATIENT
Start: 2025-08-15 | End: 2025-08-15

## 2025-08-15 RX ADMIN — ONDANSETRON 4 MG: 4 TABLET, ORALLY DISINTEGRATING ORAL at 10:00

## 2025-08-15 RX ADMIN — SODIUM CHLORIDE 1000 ML: 0.9 INJECTION, SOLUTION INTRAVENOUS at 10:01

## 2025-08-15 RX ADMIN — KETOROLAC TROMETHAMINE 15 MG: 15 INJECTION, SOLUTION INTRAMUSCULAR; INTRAVENOUS at 10:00

## 2025-08-15 ASSESSMENT — ACTIVITIES OF DAILY LIVING (ADL)
ADLS_ACUITY_SCORE: 41

## 2025-08-15 ASSESSMENT — COLUMBIA-SUICIDE SEVERITY RATING SCALE - C-SSRS
6. HAVE YOU EVER DONE ANYTHING, STARTED TO DO ANYTHING, OR PREPARED TO DO ANYTHING TO END YOUR LIFE?: NO
1. IN THE PAST MONTH, HAVE YOU WISHED YOU WERE DEAD OR WISHED YOU COULD GO TO SLEEP AND NOT WAKE UP?: NO
2. HAVE YOU ACTUALLY HAD ANY THOUGHTS OF KILLING YOURSELF IN THE PAST MONTH?: NO

## 2025-08-23 ASSESSMENT — ANXIETY QUESTIONNAIRES
3. WORRYING TOO MUCH ABOUT DIFFERENT THINGS: SEVERAL DAYS
GAD7 TOTAL SCORE: 4
2. NOT BEING ABLE TO STOP OR CONTROL WORRYING: SEVERAL DAYS
IF YOU CHECKED OFF ANY PROBLEMS ON THIS QUESTIONNAIRE, HOW DIFFICULT HAVE THESE PROBLEMS MADE IT FOR YOU TO DO YOUR WORK, TAKE CARE OF THINGS AT HOME, OR GET ALONG WITH OTHER PEOPLE: SOMEWHAT DIFFICULT
7. FEELING AFRAID AS IF SOMETHING AWFUL MIGHT HAPPEN: NOT AT ALL
GAD7 TOTAL SCORE: 4
4. TROUBLE RELAXING: NOT AT ALL
8. IF YOU CHECKED OFF ANY PROBLEMS, HOW DIFFICULT HAVE THESE MADE IT FOR YOU TO DO YOUR WORK, TAKE CARE OF THINGS AT HOME, OR GET ALONG WITH OTHER PEOPLE?: SOMEWHAT DIFFICULT
GAD7 TOTAL SCORE: 4
6. BECOMING EASILY ANNOYED OR IRRITABLE: SEVERAL DAYS
5. BEING SO RESTLESS THAT IT IS HARD TO SIT STILL: NOT AT ALL
1. FEELING NERVOUS, ANXIOUS, OR ON EDGE: SEVERAL DAYS
7. FEELING AFRAID AS IF SOMETHING AWFUL MIGHT HAPPEN: NOT AT ALL

## 2025-08-25 ENCOUNTER — OFFICE VISIT (OUTPATIENT)
Dept: FAMILY MEDICINE | Facility: CLINIC | Age: 40
End: 2025-08-25
Payer: COMMERCIAL

## 2025-08-25 VITALS
BODY MASS INDEX: 33.21 KG/M2 | HEART RATE: 91 BPM | TEMPERATURE: 98.5 F | WEIGHT: 232 LBS | OXYGEN SATURATION: 99 % | HEIGHT: 70 IN | DIASTOLIC BLOOD PRESSURE: 85 MMHG | RESPIRATION RATE: 20 BRPM | SYSTOLIC BLOOD PRESSURE: 128 MMHG

## 2025-08-25 DIAGNOSIS — Z88.9 DRUG ALLERGY: ICD-10-CM

## 2025-08-25 DIAGNOSIS — Z12.31 VISIT FOR SCREENING MAMMOGRAM: ICD-10-CM

## 2025-08-25 DIAGNOSIS — R31.9 HEMATURIA, UNSPECIFIED TYPE: Primary | ICD-10-CM

## 2025-08-25 DIAGNOSIS — Z13.6 SCREENING FOR CARDIOVASCULAR CONDITION: ICD-10-CM

## 2025-08-25 LAB
ALBUMIN UR-MCNC: NEGATIVE MG/DL
AMORPH CRY #/AREA URNS HPF: ABNORMAL /HPF
APPEARANCE UR: ABNORMAL
BACTERIA #/AREA URNS HPF: ABNORMAL /HPF
BILIRUB UR QL STRIP: NEGATIVE
COLOR UR AUTO: YELLOW
GLUCOSE UR STRIP-MCNC: NEGATIVE MG/DL
HGB UR QL STRIP: ABNORMAL
KETONES UR STRIP-MCNC: NEGATIVE MG/DL
LEUKOCYTE ESTERASE UR QL STRIP: NEGATIVE
MUCOUS THREADS #/AREA URNS LPF: PRESENT /LPF
NITRATE UR QL: NEGATIVE
PH UR STRIP: 7 [PH] (ref 5–7)
RBC #/AREA URNS AUTO: ABNORMAL /HPF
SP GR UR STRIP: 1.01 (ref 1–1.03)
SQUAMOUS #/AREA URNS AUTO: ABNORMAL /LPF
UROBILINOGEN UR STRIP-ACNC: 0.2 E.U./DL
WBC #/AREA URNS AUTO: ABNORMAL /HPF

## 2025-08-25 PROCEDURE — G2211 COMPLEX E/M VISIT ADD ON: HCPCS | Performed by: FAMILY MEDICINE

## 2025-08-25 PROCEDURE — 3074F SYST BP LT 130 MM HG: CPT | Performed by: FAMILY MEDICINE

## 2025-08-25 PROCEDURE — 99214 OFFICE O/P EST MOD 30 MIN: CPT | Performed by: FAMILY MEDICINE

## 2025-08-25 PROCEDURE — 3079F DIAST BP 80-89 MM HG: CPT | Performed by: FAMILY MEDICINE

## 2025-08-25 PROCEDURE — 81001 URINALYSIS AUTO W/SCOPE: CPT | Performed by: FAMILY MEDICINE

## 2025-08-26 ENCOUNTER — PATIENT OUTREACH (OUTPATIENT)
Dept: CARE COORDINATION | Facility: CLINIC | Age: 40
End: 2025-08-26
Payer: COMMERCIAL

## 2025-08-26 ENCOUNTER — HOSPITAL ENCOUNTER (OUTPATIENT)
Dept: CT IMAGING | Facility: CLINIC | Age: 40
Discharge: HOME | End: 2025-08-26
Attending: FAMILY MEDICINE
Payer: COMMERCIAL

## 2025-08-26 DIAGNOSIS — R31.9 HEMATURIA, UNSPECIFIED TYPE: ICD-10-CM

## 2025-08-26 PROCEDURE — 74178 CT ABD&PLV WO CNTR FLWD CNTR: CPT

## 2025-08-26 PROCEDURE — 250N000009 HC RX 250: Performed by: FAMILY MEDICINE

## 2025-08-26 PROCEDURE — 255N000002 HC RX 255 OP 636: Performed by: FAMILY MEDICINE

## 2025-08-26 RX ADMIN — IOHEXOL 120 ML: 350 INJECTION, SOLUTION INTRAVENOUS at 08:40

## 2025-08-26 RX ADMIN — SODIUM CHLORIDE 90 ML: 9 INJECTION, SOLUTION INTRAVENOUS at 08:40
